# Patient Record
Sex: FEMALE | Race: OTHER | Employment: PART TIME | URBAN - METROPOLITAN AREA
[De-identification: names, ages, dates, MRNs, and addresses within clinical notes are randomized per-mention and may not be internally consistent; named-entity substitution may affect disease eponyms.]

---

## 2022-10-11 ENCOUNTER — APPOINTMENT (OUTPATIENT)
Dept: CT IMAGING | Age: 22
End: 2022-10-11
Attending: STUDENT IN AN ORGANIZED HEALTH CARE EDUCATION/TRAINING PROGRAM

## 2022-10-11 ENCOUNTER — APPOINTMENT (OUTPATIENT)
Dept: GENERAL RADIOLOGY | Age: 22
End: 2022-10-11
Attending: STUDENT IN AN ORGANIZED HEALTH CARE EDUCATION/TRAINING PROGRAM

## 2022-10-11 ENCOUNTER — HOSPITAL ENCOUNTER (EMERGENCY)
Age: 22
Discharge: HOME | End: 2022-10-11
Attending: EMERGENCY MEDICINE

## 2022-10-11 VITALS
RESPIRATION RATE: 16 BRPM | WEIGHT: 160 LBS | DIASTOLIC BLOOD PRESSURE: 84 MMHG | HEART RATE: 92 BPM | SYSTOLIC BLOOD PRESSURE: 123 MMHG | OXYGEN SATURATION: 98 % | TEMPERATURE: 98.5 F

## 2022-10-11 DIAGNOSIS — R07.9 CHEST PAIN, UNSPECIFIED TYPE: ICD-10-CM

## 2022-10-11 DIAGNOSIS — G44.89 OTHER HEADACHE SYNDROME: Primary | ICD-10-CM

## 2022-10-11 LAB
ALBUMIN SERPL-MCNC: 4.1 G/DL (ref 3.5–5)
ALBUMIN/GLOB SERPL: 0.9 {RATIO} (ref 1.1–2.2)
ALP SERPL-CCNC: 128 U/L (ref 45–117)
ALT SERPL-CCNC: 32 U/L (ref 12–78)
ANION GAP SERPL CALC-SCNC: 8 MMOL/L (ref 5–15)
AST SERPL-CCNC: 23 U/L (ref 15–37)
BASOPHILS # BLD: 0 K/UL (ref 0–0.1)
BASOPHILS NFR BLD: 0 % (ref 0–1)
BILIRUB SERPL-MCNC: 0.2 MG/DL (ref 0.2–1)
BUN SERPL-MCNC: 11 MG/DL (ref 6–20)
BUN/CREAT SERPL: 16 (ref 12–20)
CALCIUM SERPL-MCNC: 9.7 MG/DL (ref 8.5–10.1)
CHLORIDE SERPL-SCNC: 104 MMOL/L (ref 97–108)
CO2 SERPL-SCNC: 26 MMOL/L (ref 21–32)
COMMENT, HOLDF: NORMAL
CREAT SERPL-MCNC: 0.67 MG/DL (ref 0.55–1.02)
DIFFERENTIAL METHOD BLD: ABNORMAL
EOSINOPHIL # BLD: 0 K/UL (ref 0–0.4)
EOSINOPHIL NFR BLD: 0 % (ref 0–7)
ERYTHROCYTE [DISTWIDTH] IN BLOOD BY AUTOMATED COUNT: 13 % (ref 11.5–14.5)
GLOBULIN SER CALC-MCNC: 4.7 G/DL (ref 2–4)
GLUCOSE SERPL-MCNC: 93 MG/DL (ref 65–100)
HCG UR QL: NEGATIVE
HCT VFR BLD AUTO: 38.6 % (ref 35–47)
HGB BLD-MCNC: 12.8 G/DL (ref 11.5–16)
IMM GRANULOCYTES # BLD AUTO: 0 K/UL (ref 0–0.04)
IMM GRANULOCYTES NFR BLD AUTO: 0 % (ref 0–0.5)
LYMPHOCYTES # BLD: 3 K/UL (ref 0.8–3.5)
LYMPHOCYTES NFR BLD: 23 % (ref 12–49)
MCH RBC QN AUTO: 28.7 PG (ref 26–34)
MCHC RBC AUTO-ENTMCNC: 33.2 G/DL (ref 30–36.5)
MCV RBC AUTO: 86.5 FL (ref 80–99)
MONOCYTES # BLD: 0.8 K/UL (ref 0–1)
MONOCYTES NFR BLD: 6 % (ref 5–13)
NEUTS SEG # BLD: 9.1 K/UL (ref 1.8–8)
NEUTS SEG NFR BLD: 71 % (ref 32–75)
NRBC # BLD: 0 K/UL (ref 0–0.01)
NRBC BLD-RTO: 0 PER 100 WBC
PLATELET # BLD AUTO: 348 K/UL (ref 150–400)
PMV BLD AUTO: 10.6 FL (ref 8.9–12.9)
POTASSIUM SERPL-SCNC: 3.6 MMOL/L (ref 3.5–5.1)
PROT SERPL-MCNC: 8.8 G/DL (ref 6.4–8.2)
RBC # BLD AUTO: 4.46 M/UL (ref 3.8–5.2)
SAMPLES BEING HELD,HOLD: NORMAL
SODIUM SERPL-SCNC: 138 MMOL/L (ref 136–145)
TROPONIN-HIGH SENSITIVITY: <4 NG/L (ref 0–51)
WBC # BLD AUTO: 12.9 K/UL (ref 3.6–11)

## 2022-10-11 PROCEDURE — 70450 CT HEAD/BRAIN W/O DYE: CPT

## 2022-10-11 PROCEDURE — 80053 COMPREHEN METABOLIC PANEL: CPT

## 2022-10-11 PROCEDURE — 74011000250 HC RX REV CODE- 250: Performed by: STUDENT IN AN ORGANIZED HEALTH CARE EDUCATION/TRAINING PROGRAM

## 2022-10-11 PROCEDURE — 71250 CT THORAX DX C-: CPT

## 2022-10-11 PROCEDURE — 71046 X-RAY EXAM CHEST 2 VIEWS: CPT

## 2022-10-11 PROCEDURE — 96375 TX/PRO/DX INJ NEW DRUG ADDON: CPT

## 2022-10-11 PROCEDURE — 84484 ASSAY OF TROPONIN QUANT: CPT

## 2022-10-11 PROCEDURE — 36415 COLL VENOUS BLD VENIPUNCTURE: CPT

## 2022-10-11 PROCEDURE — 85025 COMPLETE CBC W/AUTO DIFF WBC: CPT

## 2022-10-11 PROCEDURE — 99285 EMERGENCY DEPT VISIT HI MDM: CPT

## 2022-10-11 PROCEDURE — 96374 THER/PROPH/DIAG INJ IV PUSH: CPT

## 2022-10-11 PROCEDURE — 74011250636 HC RX REV CODE- 250/636: Performed by: STUDENT IN AN ORGANIZED HEALTH CARE EDUCATION/TRAINING PROGRAM

## 2022-10-11 PROCEDURE — 74011250637 HC RX REV CODE- 250/637: Performed by: STUDENT IN AN ORGANIZED HEALTH CARE EDUCATION/TRAINING PROGRAM

## 2022-10-11 PROCEDURE — 81025 URINE PREGNANCY TEST: CPT

## 2022-10-11 PROCEDURE — 93005 ELECTROCARDIOGRAM TRACING: CPT

## 2022-10-11 RX ORDER — SODIUM CHLORIDE 9 MG/ML
1000 INJECTION, SOLUTION INTRAVENOUS ONCE
Status: COMPLETED | OUTPATIENT
Start: 2022-10-11 | End: 2022-10-11

## 2022-10-11 RX ORDER — SUMATRIPTAN 25 MG/1
TABLET, FILM COATED ORAL
Qty: 12 TABLET | Refills: 0 | Status: SHIPPED | OUTPATIENT
Start: 2022-10-11

## 2022-10-11 RX ORDER — KETOROLAC TROMETHAMINE 30 MG/ML
30 INJECTION, SOLUTION INTRAMUSCULAR; INTRAVENOUS
Status: COMPLETED | OUTPATIENT
Start: 2022-10-11 | End: 2022-10-11

## 2022-10-11 RX ORDER — PROCHLORPERAZINE EDISYLATE 5 MG/ML
10 INJECTION INTRAMUSCULAR; INTRAVENOUS
Status: COMPLETED | OUTPATIENT
Start: 2022-10-11 | End: 2022-10-11

## 2022-10-11 RX ORDER — DIPHENHYDRAMINE HYDROCHLORIDE 50 MG/ML
25 INJECTION, SOLUTION INTRAMUSCULAR; INTRAVENOUS
Status: COMPLETED | OUTPATIENT
Start: 2022-10-11 | End: 2022-10-11

## 2022-10-11 RX ORDER — DEXAMETHASONE SODIUM PHOSPHATE 10 MG/ML
10 INJECTION INTRAMUSCULAR; INTRAVENOUS ONCE
Status: COMPLETED | OUTPATIENT
Start: 2022-10-11 | End: 2022-10-11

## 2022-10-11 RX ADMIN — DEXAMETHASONE SODIUM PHOSPHATE 10 MG: 10 INJECTION, SOLUTION INTRAMUSCULAR; INTRAVENOUS at 18:14

## 2022-10-11 RX ADMIN — ALUMINUM HYDROXIDE, MAGNESIUM HYDROXIDE, DIMETHICONE 40 ML: 400; 400; 40 SUSPENSION ORAL at 18:54

## 2022-10-11 RX ADMIN — PROCHLORPERAZINE EDISYLATE 10 MG: 5 INJECTION INTRAMUSCULAR; INTRAVENOUS at 18:13

## 2022-10-11 RX ADMIN — SODIUM CHLORIDE 1000 ML/HR: 9 INJECTION, SOLUTION INTRAVENOUS at 18:13

## 2022-10-11 RX ADMIN — DIPHENHYDRAMINE HYDROCHLORIDE 25 MG: 50 INJECTION, SOLUTION INTRAMUSCULAR; INTRAVENOUS at 18:13

## 2022-10-11 RX ADMIN — KETOROLAC TROMETHAMINE 30 MG: 30 INJECTION, SOLUTION INTRAMUSCULAR at 18:14

## 2022-10-11 NOTE — DISCHARGE INSTRUCTIONS
Recommend follow-up with your primary care physician. Take sumatriptan if headache onsets again. If you develop new or worsening symptoms, please return to ER.

## 2022-10-11 NOTE — ED TRIAGE NOTES
Patient reports she started with a headache and dizziness on Thursday-    Patient reports she has been having chest pain for the last few years but reports it has gotten worse over the last few days-

## 2022-10-11 NOTE — ED PROVIDER NOTES
Patient is a 25year old female who presents to ED c/o headache which started 5 days prior. Reports headache is behind her right eye, constant, associated with photophobia, nausea, and dizziness. Patient reports headache has been constant and not alleviated by any factors. Patient also c/o left sided chest pain that has been intermittent \"for years\" states it has worsened the past few days and has been constant. She denies any fever, chills, nausea, vomiting, numbness, weakness, facial asymmetry, visual disturbances, neck pain, abdominal pain, urinary symptoms, cough, shortness of breath. Seen at [facility] and referred to ED for further evaluation and management. No past medical history on file. No past surgical history on file. No family history on file. Social History     Socioeconomic History    Marital status: Not on file     Spouse name: Not on file    Number of children: Not on file    Years of education: Not on file    Highest education level: Not on file   Occupational History    Not on file   Tobacco Use    Smoking status: Not on file    Smokeless tobacco: Not on file   Substance and Sexual Activity    Alcohol use: Not on file    Drug use: Not on file    Sexual activity: Not on file   Other Topics Concern    Not on file   Social History Narrative    Not on file     Social Determinants of Health     Financial Resource Strain: Not on file   Food Insecurity: Not on file   Transportation Needs: Not on file   Physical Activity: Not on file   Stress: Not on file   Social Connections: Not on file   Intimate Partner Violence: Not on file   Housing Stability: Not on file         ALLERGIES: Patient has no known allergies. Review of Systems   Constitutional:  Negative for activity change, appetite change, chills and fever. HENT:  Negative for congestion and sore throat. Eyes:  Positive for photophobia and pain. Negative for discharge, itching and visual disturbance.    Respiratory: Negative for cough and shortness of breath. Cardiovascular:  Positive for chest pain. Negative for palpitations and leg swelling. Gastrointestinal:  Negative for abdominal distention, abdominal pain, constipation, diarrhea, nausea and vomiting. Genitourinary:  Negative for decreased urine volume, dysuria, flank pain, frequency and urgency. Musculoskeletal:  Negative for back pain and neck pain. Skin:  Negative for rash and wound. Allergic/Immunologic: Negative for immunocompromised state. Neurological:  Positive for headaches. Negative for dizziness, tremors, seizures, syncope, facial asymmetry, speech difficulty, weakness, light-headedness and numbness. Psychiatric/Behavioral:  Negative for confusion. All other systems reviewed and are negative. Vitals:    10/11/22 1737   BP: 123/84   Pulse: 92   Resp: 16   Temp: 98.5 °F (36.9 °C)   SpO2: 98%   Weight: 72.6 kg (160 lb)            Physical Exam  Vitals and nursing note reviewed. Constitutional:       General: She is not in acute distress. Appearance: Normal appearance. She is well-developed. She is not toxic-appearing. HENT:      Head: Normocephalic and atraumatic. Nose: Nose normal.      Mouth/Throat:      Mouth: Mucous membranes are moist.   Eyes:      General: Lids are normal.      Extraocular Movements: Extraocular movements intact. Conjunctiva/sclera: Conjunctivae normal.      Pupils: Pupils are equal, round, and reactive to light. Cardiovascular:      Rate and Rhythm: Normal rate and regular rhythm. Pulses: Normal pulses. Heart sounds: Normal heart sounds, S1 normal and S2 normal.   Pulmonary:      Effort: Pulmonary effort is normal. No accessory muscle usage or respiratory distress. Breath sounds: Normal breath sounds. No stridor. No wheezing, rhonchi or rales. Chest:      Chest wall: No tenderness. Abdominal:      Palpations: Abdomen is soft. Tenderness: There is no abdominal tenderness. There is no right CVA tenderness, left CVA tenderness, guarding or rebound. Musculoskeletal:         General: Normal range of motion. Cervical back: Normal range of motion and neck supple. Skin:     General: Skin is warm and dry. Capillary Refill: Capillary refill takes less than 2 seconds. Neurological:      General: No focal deficit present. Mental Status: She is alert and oriented to person, place, and time. Mental status is at baseline. Cranial Nerves: Cranial nerves 2-12 are intact. No cranial nerve deficit. Sensory: No sensory deficit. Motor: No weakness. Coordination: Coordination normal. Finger-Nose-Finger Test normal.      Gait: Gait is intact. Gait normal.   Psychiatric:         Attention and Perception: Attention normal.         Mood and Affect: Mood and affect normal.         Speech: Speech normal.         Behavior: Behavior is cooperative. Thought Content: Thought content normal.         Cognition and Memory: Cognition normal.         Judgment: Judgment normal.        MDM  Number of Diagnoses or Management Options  Chest pain, unspecified type  Other headache syndrome  Diagnosis management comments: Patient presents with history as described above. Labs unremarkable. Ekg NSR, no ischemic changes noted. Troponin 4, low risk to r/o ACS. CT head negative. Patient given migraine cocktail and initially reported she was having a reaction. Evaluated patient  and her symptoms improved time. Chest x-ray showed possible foreign body in left mainstem bronchus. 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 Spoke with patient's mother who reports patient did have surgery and she was unsure the name of it. Patient reports complete resolution of symptoms and states she feels much improved. Discussed results and advise follow-up with neurology.   Strict return to ER precautions lorin in detail with patient and mother. All questions addressed and answered. Amount and/or Complexity of Data Reviewed  Clinical lab tests: reviewed  Tests in the radiology section of CPT®: reviewed  Discuss the patient with other providers: yes (Dr. Sandi Lopez, ED attending )      ED Course as of 10/11/22 2318   Tue Oct 11, 2022   1830 Called to patient's bedside by nurse because patient reported near syncope and shaking after being administered diphenhydramine. Neurologic exam was unremarkable at that time. Shaking noted at rest when present,  but when patient is alone there is no visible tremor or shaking. She is resting comfortably in NAD. Texting on her cell phone without any shaking noted. [KG]   1943 CT CHEST WO CONT: IMPRESSION  1. High density foreign body located between aortic arch and main pulmonary  trunk most suggestive of remote PDA closure device. Correlate with history and  prior imaging. 2.  No acute airspace disease or foreign body within the airway.  [KG]      ED Course User Index  [KG] Alan Jaffe [facility]

## 2022-10-11 NOTE — Clinical Note
[address]  [facility] EMERGENCY DEPT  [address] [id number]  [phone]    Work/School Note    Date: 10/11/2022    To Whom It May concern:    Gisselle White was seen and treated today in the emergency room by the following provider(s):  Attending Provider: Eric Ferreira MD  Physician Assistant: SONG Strong. Gisselle White is excused from work/school on 10/11/22 and 10/12/22. She is medically clear to return to work/school on 10/13/2022.        Sincerely,          SONG Petersen

## 2022-10-12 LAB
ATRIAL RATE: 92 BPM
CALCULATED P AXIS, ECG09: 35 DEGREES
CALCULATED R AXIS, ECG10: 63 DEGREES
CALCULATED T AXIS, ECG11: 49 DEGREES
DIAGNOSIS, 93000: NORMAL
P-R INTERVAL, ECG05: 148 MS
Q-T INTERVAL, ECG07: 358 MS
QRS DURATION, ECG06: 78 MS
QTC CALCULATION (BEZET), ECG08: 442 MS
VENTRICULAR RATE, ECG03: 92 BPM

## 2023-04-19 ENCOUNTER — TRANSCRIBE ORDER (OUTPATIENT)
Dept: SCHEDULING | Age: 23
End: 2023-04-19

## 2023-04-19 DIAGNOSIS — R10.2 PELVIC PAIN IN FEMALE: Primary | ICD-10-CM

## 2023-04-23 DIAGNOSIS — R10.2 PELVIC PAIN IN FEMALE: Primary | ICD-10-CM

## 2023-04-24 DIAGNOSIS — R10.2 PELVIC PAIN IN FEMALE: Primary | ICD-10-CM

## 2023-05-09 ENCOUNTER — HOSPITAL ENCOUNTER (OUTPATIENT)
Facility: HOSPITAL | Age: 23
Discharge: HOME | End: 2023-05-12

## 2023-05-09 DIAGNOSIS — R10.2 PELVIC PAIN IN FEMALE: ICD-10-CM

## 2023-05-09 PROCEDURE — 76830 TRANSVAGINAL US NON-OB: CPT

## 2023-05-09 PROCEDURE — 76856 US EXAM PELVIC COMPLETE: CPT

## 2024-04-25 ENCOUNTER — INITIAL PRENATAL (OUTPATIENT)
Age: 24
End: 2024-04-25

## 2024-04-25 ENCOUNTER — OFFICE VISIT (OUTPATIENT)
Age: 24
End: 2024-04-25

## 2024-04-25 VITALS
HEIGHT: 63 IN | DIASTOLIC BLOOD PRESSURE: 63 MMHG | OXYGEN SATURATION: 98 % | HEART RATE: 83 BPM | BODY MASS INDEX: 27.11 KG/M2 | SYSTOLIC BLOOD PRESSURE: 98 MMHG | TEMPERATURE: 98.6 F | RESPIRATION RATE: 18 BRPM | WEIGHT: 153 LBS

## 2024-04-25 DIAGNOSIS — Z98.890 HISTORY OF HEART SURGERY: ICD-10-CM

## 2024-04-25 DIAGNOSIS — Z13.31 POSITIVE DEPRESSION SCREENING: ICD-10-CM

## 2024-04-25 DIAGNOSIS — O26.899 VAGINAL DISCHARGE DURING PREGNANCY, ANTEPARTUM: ICD-10-CM

## 2024-04-25 DIAGNOSIS — Z59.9 FINANCIAL DIFFICULTY: Primary | ICD-10-CM

## 2024-04-25 DIAGNOSIS — O21.9 NAUSEA AND VOMITING IN PREGNANCY: ICD-10-CM

## 2024-04-25 DIAGNOSIS — Z34.80 SUPERVISION OF OTHER NORMAL PREGNANCY, ANTEPARTUM: Primary | ICD-10-CM

## 2024-04-25 DIAGNOSIS — O23.599 BACTERIAL VAGINOSIS IN PREGNANCY: ICD-10-CM

## 2024-04-25 DIAGNOSIS — O99.340 DEPRESSIVE DISORDER IN MOTHER AFFECTING PREGNANCY: ICD-10-CM

## 2024-04-25 DIAGNOSIS — F32.A DEPRESSIVE DISORDER IN MOTHER AFFECTING PREGNANCY: ICD-10-CM

## 2024-04-25 DIAGNOSIS — N89.8 VAGINAL DISCHARGE DURING PREGNANCY, ANTEPARTUM: ICD-10-CM

## 2024-04-25 DIAGNOSIS — Z13.9 ENCOUNTER FOR SCREENING INVOLVING SOCIAL DETERMINANTS OF HEALTH (SDOH): ICD-10-CM

## 2024-04-25 DIAGNOSIS — B96.89 BACTERIAL VAGINOSIS IN PREGNANCY: ICD-10-CM

## 2024-04-25 LAB
BACTERIA, WET MOUNT, POC: NORMAL
BILIRUBIN, URINE, POC: NEGATIVE
BLOOD URINE, POC: NEGATIVE
CLUE CELLS, WET MOUNT, POC: PRESENT
GLUCOSE URINE, POC: NEGATIVE
KETONES, URINE, POC: NEGATIVE
LEUKOCYTE ESTERASE, URINE, POC: NEGATIVE
NITRITE, URINE, POC: NEGATIVE
PH, URINE, POC: 7 (ref 4.6–8)
PROTEIN,URINE, POC: NEGATIVE
RBC WET MOUNT, POC: NEGATIVE
SPECIFIC GRAVITY, URINE, POC: 1.02 (ref 1–1.03)
TRICH, WET MOUNT, POC: NORMAL
URINALYSIS CLARITY, POC: CLEAR
URINALYSIS COLOR, POC: YELLOW
UROBILINOGEN, POC: NORMAL
WBC, WET MOUNT, POC: NORMAL
YEAST, WET MOUNT, POC: NORMAL

## 2024-04-25 PROCEDURE — 87210 SMEAR WET MOUNT SALINE/INK: CPT | Performed by: FAMILY MEDICINE

## 2024-04-25 PROCEDURE — 81003 URINALYSIS AUTO W/O SCOPE: CPT | Performed by: FAMILY MEDICINE

## 2024-04-25 RX ORDER — METRONIDAZOLE 500 MG/1
500 TABLET ORAL 2 TIMES DAILY
Qty: 14 TABLET | Refills: 0 | Status: SHIPPED | OUTPATIENT
Start: 2024-04-25 | End: 2024-05-02

## 2024-04-25 RX ORDER — PROMETHAZINE HYDROCHLORIDE 12.5 MG/1
12.5 TABLET ORAL 3 TIMES DAILY PRN
Qty: 30 TABLET | Refills: 0 | Status: SHIPPED | OUTPATIENT
Start: 2024-04-25

## 2024-04-25 RX ORDER — PYRIDOXINE HCL (VITAMIN B6) 50 MG
TABLET ORAL
Qty: 30 TABLET | Refills: 3 | Status: SHIPPED | OUTPATIENT
Start: 2024-04-25

## 2024-04-25 SDOH — ECONOMIC STABILITY: HOUSING INSECURITY
IN THE LAST 12 MONTHS, WAS THERE A TIME WHEN YOU DID NOT HAVE A STEADY PLACE TO SLEEP OR SLEPT IN A SHELTER (INCLUDING NOW)?: NO

## 2024-04-25 SDOH — ECONOMIC STABILITY: INCOME INSECURITY: HOW HARD IS IT FOR YOU TO PAY FOR THE VERY BASICS LIKE FOOD, HOUSING, MEDICAL CARE, AND HEATING?: SOMEWHAT HARD

## 2024-04-25 SDOH — ECONOMIC STABILITY: FOOD INSECURITY: WITHIN THE PAST 12 MONTHS, THE FOOD YOU BOUGHT JUST DIDN'T LAST AND YOU DIDN'T HAVE MONEY TO GET MORE.: PATIENT DECLINED

## 2024-04-25 SDOH — ECONOMIC STABILITY: FOOD INSECURITY: WITHIN THE PAST 12 MONTHS, YOU WORRIED THAT YOUR FOOD WOULD RUN OUT BEFORE YOU GOT MONEY TO BUY MORE.: PATIENT DECLINED

## 2024-04-25 SDOH — ECONOMIC STABILITY - INCOME SECURITY: PROBLEM RELATED TO HOUSING AND ECONOMIC CIRCUMSTANCES, UNSPECIFIED: Z59.9

## 2024-04-25 ASSESSMENT — PATIENT HEALTH QUESTIONNAIRE - PHQ9
10. IF YOU CHECKED OFF ANY PROBLEMS, HOW DIFFICULT HAVE THESE PROBLEMS MADE IT FOR YOU TO DO YOUR WORK, TAKE CARE OF THINGS AT HOME, OR GET ALONG WITH OTHER PEOPLE: VERY DIFFICULT
SUM OF ALL RESPONSES TO PHQ QUESTIONS 1-9: 13
3. TROUBLE FALLING OR STAYING ASLEEP: SEVERAL DAYS
5. POOR APPETITE OR OVEREATING: MORE THAN HALF THE DAYS
7. TROUBLE CONCENTRATING ON THINGS, SUCH AS READING THE NEWSPAPER OR WATCHING TELEVISION: SEVERAL DAYS
6. FEELING BAD ABOUT YOURSELF - OR THAT YOU ARE A FAILURE OR HAVE LET YOURSELF OR YOUR FAMILY DOWN: SEVERAL DAYS
SUM OF ALL RESPONSES TO PHQ9 QUESTIONS 1 & 2: 5
SUM OF ALL RESPONSES TO PHQ QUESTIONS 1-9: 13
2. FEELING DOWN, DEPRESSED OR HOPELESS: NEARLY EVERY DAY
SUM OF ALL RESPONSES TO PHQ QUESTIONS 1-9: 13
8. MOVING OR SPEAKING SO SLOWLY THAT OTHER PEOPLE COULD HAVE NOTICED. OR THE OPPOSITE, BEING SO FIGETY OR RESTLESS THAT YOU HAVE BEEN MOVING AROUND A LOT MORE THAN USUAL: SEVERAL DAYS
9. THOUGHTS THAT YOU WOULD BE BETTER OFF DEAD, OR OF HURTING YOURSELF: NOT AT ALL
SUM OF ALL RESPONSES TO PHQ QUESTIONS 1-9: 13
1. LITTLE INTEREST OR PLEASURE IN DOING THINGS: MORE THAN HALF THE DAYS
4. FEELING TIRED OR HAVING LITTLE ENERGY: MORE THAN HALF THE DAYS

## 2024-04-25 ASSESSMENT — ANXIETY QUESTIONNAIRES
IF YOU CHECKED OFF ANY PROBLEMS ON THIS QUESTIONNAIRE, HOW DIFFICULT HAVE THESE PROBLEMS MADE IT FOR YOU TO DO YOUR WORK, TAKE CARE OF THINGS AT HOME, OR GET ALONG WITH OTHER PEOPLE: VERY DIFFICULT
3. WORRYING TOO MUCH ABOUT DIFFERENT THINGS: NEARLY EVERY DAY
7. FEELING AFRAID AS IF SOMETHING AWFUL MIGHT HAPPEN: NEARLY EVERY DAY
6. BECOMING EASILY ANNOYED OR IRRITABLE: NEARLY EVERY DAY
5. BEING SO RESTLESS THAT IT IS HARD TO SIT STILL: SEVERAL DAYS
2. NOT BEING ABLE TO STOP OR CONTROL WORRYING: MORE THAN HALF THE DAYS
1. FEELING NERVOUS, ANXIOUS, OR ON EDGE: MORE THAN HALF THE DAYS
GAD7 TOTAL SCORE: 16
4. TROUBLE RELAXING: MORE THAN HALF THE DAYS

## 2024-04-25 NOTE — PATIENT INSTRUCTIONS
[id number]  1st and 3rd Fridays, distribution starts at 4:30 pm  [address] [facility] [address]      [facility] (continued)   Saturdays, distribution starts at 10 am  1st - [employer], [address] [facility] [address]  2nd - [facility] [employer], [address] [facility] [address]  3rd - [facility], [address] [address] [place] [address]  4th - [facility] [employer], [address] [facility] [address]    [facility] - [address] [place] [address]  [phone]; Thursdays 530 pm -730 pm & Fridays 1130 am - 130 pm    [facility] - ERP - [address] [place], [place]  [id number] [phone]    [address] [address] [phone]; Monday and Tuesday, 2 pm, 3rd Wednesday 12 pm, 4th Friday 9 am    [facility] [place] - [address] [place] [address] [phone]; Saturday 8-10:30 am     [address] [address] [phone]; Tuesday 9 am - 12 pm & 3-5 pm, Friday 3-5 pm, Saturday 9 am - 12 pm

## 2024-04-25 NOTE — PATIENT INSTRUCTIONS
financiero: [phone]    [facility]  Lo que ofrecen: recursos de atención de la beau móviles para pacientes no asegurados.  Contacto: [phone]    [facility]  Lo que ofrecen: pruebas de detección y vacunas.  Contacto: [phone]      [employer] ([facility])  Lo que ofrecen: mamografías y [place] independientemente de pathak capacidad de pago.  Contacto: [phone]    Asistencia financiera para la beau de [facility]  Lo que ofrecen: U proporciona asistencia financiera a los pacientes en función de maurice ingresos, activos y necesidades. También se puede brindar asistencia para obtener seguro de beau gratuito o de bajo costo o para organizar un plan de pago manejable.   Sitio web: [email or web address]-[email or web address]-assistance  La solicitud de asistencia se puede descargar del sitio web anterior.  [facility] telefóSt. [facility] Hospitalo de asesoramiento financiero: [phone]      Medicamentos    Good Rx  Lo que ofrecen: Good Rx hace un seguimiento de precios de fármacos recetados y ofrece cupones gratuitos de fármacos para obtener descuentos en medicamentos.  XIFINio web: https://www.AmVac/     [facility]   Lo que ofrecen: [facility] ofrece información gratuita sobre medicamentos y programas de ahorros en costos de atención de la beau, incluidos programas de asistencia con recetas, cupones y programas de descuento.  XIFINio web: https://www.ZYOMYX.org/   Línea de ayuda: [phone]    RX Assist   Lo que ofrecen: información sobre programas de medicamentos gratuitos y de bajo costo.  Sitio web: https://www.rxassist.org/     Programa de recetas de $4 de [employer]  Lo que ofrecen: el Programa de recetas incluye un suministro de hasta 30 días por $4 y un suministro de hasta 90 días por $10 en algunos fármacos genéricos cubiertos en las dosis frecuentemente recetadas.  Sitio web: https://www.[employer]/cp/4-prescriptions/1256595.            Servicios públicos    CommonHelp  Lo que

## 2024-04-25 NOTE — PROGRESS NOTES
SW Navigator met with patient to complete initial social needs assessment. Patient verified and confirmed demographics on file.      Review of SDOH:   +financial difficulty, +depression    Medical insurance? 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 Not enrolled  - Support system, FOB and family  - Support Services - Are you aware of social programs available to you? Yes, recently applied for SNAP benefits  - OB: Do you have the support needed once baby arrives? yes  - Any other worries or concerns, no    Personal Safety:  Domestic violence - No hx of domestic violence  General safety - Patient feels safe in relationship, in home, and in neighborhood    Depression screening:   PHQ2=4, positive  PHQ9=13, positive    Patient is 24 yo pregnant female. She lives in home with her partner and her 2 children ages 9 and 7 (both children in school and doing well). Patient's mother and step-father also live in home. 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 Finances have been a bit strained lately as she has not been to work due to not feeling well. Patient reports feeling anxious, not herself, and crying daily. Patient with prior hx of depression and anxiety during pregnancy. Positive depression screening today. AASHISH provided patient with

## 2024-04-25 NOTE — PROGRESS NOTES
[facility] Office Visit     Assessment/ Plan: Susana Navarro is a 23 y.o.  here for IOB.     24yo  at 11w4 by L/    IUP: IOB labs today  [facility]  genetic testing with insurance   Depression/Anxiety: met with SW, start zoloft 50mg daily  Nausea/Vomiting/Weight Loss: trial of B6 + phenergan, CMP/TSH  Vaginal Discharge: consistent with BV - metronidazole     Met with CM/AASHISH, applying for Medicaid   Estimated Date of Delivery: 11/10/24    30 minutes were spent on the day of this encounter both with the patient and in related activities including chart review, care coordination and counseling.     Patient instructions were discussed and/or provided in the AVS. The patient understands and agrees to the plan.    RETURN TO CARE: 1 month   Future Appointments   Date Time Provider Department Center   2024  8:40 AM Marybeth Dawkins, DO [facility] BS AMB   2024  8:40 AM Marybeth Dawkins, DO [facility] BS AMB         Subjective:  Chief Complaint   Patient presents with    Initial Prenatal Visit     Patient is coming in for her initial prenatal visit. LMP was 2024. She is G3, P2. Patient is 11 weeks and 4 day. JOSE ELIAS: 11/10 2024. She is not having vaginal bleeding. She is having white pasty discharge with odor and itchiness. She is taking prenatal vitamins. She is having fetal movement. No contractions. Patient has nausea, vomiting, and dizziness since the beginning of her pregnancy. Patient is also having right ear pain since yesterday. No other concerns.        HPI: Susana Navarro is a 23 y.o.  at 11w4 who is being seen today for her first obstetrical visit.    Vaginal Discharge  - went [facility] ER and was told no infection (went to [facility])   - sometimes itchy, smells bad - white paste   - has tried wipes (Vagisil, baby wipes)     Pap   - NILM 3/12, neg G/C/T    Prenatal Labs  - T pall neg ([facility]), HIV neg, CBC

## 2024-04-25 NOTE — PROGRESS NOTES
Susana Navarro is a 23 y.o. female      Chief Complaint   Patient presents with    Initial Prenatal Visit     Patient is coming in for her initial prenatal visit. LMP was 02/04/2024. She is G3, P2. Patient is 11 weeks and 4 day. JOSE ELIAS: 11/10 2024. She is not having vaginal bleeding. She is having white pasty discharge with odor and itchiness. She is taking prenatal vitamins. She is having fetal movement. No contractions. Patient has nausea, vomiting, and dizziness since the beginning of her pregnancy. Patient is also having right ear pain since yesterday. No other concerns.        \"Have you been to the ER, urgent care clinic since your last visit?  Hospitalized since your last visit?\"    NO    “Have you seen or consulted any other health care providers outside of [facility] since your last visit?”    Yes, Patient coming in from [facility].          Vitals:    04/25/24 0921   BP: 98/63   Site: Right Upper Arm   Position: Sitting   Pulse: 83   Resp: 18   Temp: 98.6 °F (37 °C)   TempSrc: Oral   SpO2: 98%   Weight: 69.4 kg (153 lb)   Height: 1.6 m (5' 3\")            Health Maintenance Due   Topic Date Due    Hepatitis B vaccine (1 of 3 - 3-dose series) Never done    COVID-19 Vaccine (1) Never done    Varicella vaccine (1 of 2 - 2-dose childhood series) Never done    HPV vaccine (1 - 2-dose series) Never done    Depression Screen  Never done    HIV screen  Never done    Chlamydia/GC screen  Never done    Hepatitis C screen  Never done    DTaP/Tdap/Td vaccine (1 - Tdap) Never done    Pap smear  Never done         Medication Reconciliation completed, changes noted.  Please  Update medication list.

## 2024-04-26 PROBLEM — O99.340 DEPRESSIVE DISORDER IN MOTHER AFFECTING PREGNANCY: Status: ACTIVE | Noted: 2024-04-26

## 2024-04-26 PROBLEM — O21.9 NAUSEA AND VOMITING IN PREGNANCY: Status: ACTIVE | Noted: 2024-04-26

## 2024-04-26 PROBLEM — Z98.890 HISTORY OF HEART SURGERY: Status: ACTIVE | Noted: 2024-04-26

## 2024-04-26 PROBLEM — F32.A DEPRESSIVE DISORDER IN MOTHER AFFECTING PREGNANCY: Status: ACTIVE | Noted: 2024-04-26

## 2024-04-26 LAB
ABO + RH BLD: NORMAL
ALBUMIN SERPL-MCNC: 3.7 G/DL (ref 3.5–5)
ALBUMIN/GLOB SERPL: 0.9 (ref 1.1–2.2)
ALP SERPL-CCNC: 124 U/L (ref 45–117)
ALT SERPL-CCNC: 41 U/L (ref 12–78)
ANION GAP SERPL CALC-SCNC: 9 MMOL/L (ref 5–15)
AST SERPL-CCNC: 12 U/L (ref 15–37)
BACTERIA SPEC CULT: ABNORMAL
BILIRUB SERPL-MCNC: 0.3 MG/DL (ref 0.2–1)
BLOOD BANK CMNT PATIENT-IMP: NORMAL
BLOOD GROUP ANTIBODIES SERPL: NORMAL
BUN SERPL-MCNC: 4 MG/DL (ref 6–20)
BUN/CREAT SERPL: 8 (ref 12–20)
CALCIUM SERPL-MCNC: 9.8 MG/DL (ref 8.5–10.1)
CC UR VC: ABNORMAL
CHLORIDE SERPL-SCNC: 104 MMOL/L (ref 97–108)
CO2 SERPL-SCNC: 24 MMOL/L (ref 21–32)
CREAT SERPL-MCNC: 0.48 MG/DL (ref 0.55–1.02)
ERYTHROCYTE [DISTWIDTH] IN BLOOD BY AUTOMATED COUNT: 13.2 % (ref 11.5–14.5)
EST. AVERAGE GLUCOSE BLD GHB EST-MCNC: 100 MG/DL
GLOBULIN SER CALC-MCNC: 4.1 G/DL (ref 2–4)
GLUCOSE SERPL-MCNC: 78 MG/DL (ref 65–100)
HBA1C MFR BLD: 5.1 % (ref 4–5.6)
HBV SURFACE AB SER QL: REACTIVE
HBV SURFACE AB SER-ACNC: 714.46 MIU/ML
HBV SURFACE AG SER QL: <0.1 INDEX
HBV SURFACE AG SER QL: NEGATIVE
HCT VFR BLD AUTO: 36.3 % (ref 35–47)
HCV AB SER IA-ACNC: 0.19 INDEX
HCV AB SERPL QL IA: NONREACTIVE
HGB BLD-MCNC: 12.4 G/DL (ref 11.5–16)
HIV 1+2 AB+HIV1 P24 AG SERPL QL IA: NONREACTIVE
HIV 1/2 RESULT COMMENT: NORMAL
MCH RBC QN AUTO: 29.3 PG (ref 26–34)
MCHC RBC AUTO-ENTMCNC: 34.2 G/DL (ref 30–36.5)
MCV RBC AUTO: 85.8 FL (ref 80–99)
NRBC # BLD: 0 K/UL (ref 0–0.01)
NRBC BLD-RTO: 0 PER 100 WBC
PLATELET # BLD AUTO: 284 K/UL (ref 150–400)
PMV BLD AUTO: 11.6 FL (ref 8.9–12.9)
POTASSIUM SERPL-SCNC: 3.9 MMOL/L (ref 3.5–5.1)
PROT SERPL-MCNC: 7.8 G/DL (ref 6.4–8.2)
RBC # BLD AUTO: 4.23 M/UL (ref 3.8–5.2)
RPR SER QL: NONREACTIVE
RUBV IGG SERPL IA-ACNC: NORMAL IU/ML
SERVICE CMNT-IMP: ABNORMAL
SODIUM SERPL-SCNC: 137 MMOL/L (ref 136–145)
SPECIMEN EXP DATE BLD: NORMAL
TSH SERPL DL<=0.05 MIU/L-ACNC: 0.49 UIU/ML (ref 0.36–3.74)
WBC # BLD AUTO: 9.2 K/UL (ref 3.6–11)

## 2024-04-27 LAB
HBV CORE AB SERPL QL IA: NEGATIVE
VZV IGG SER IA-ACNC: 567 INDEX

## 2024-04-29 LAB
C TRACH RRNA SPEC QL NAA+PROBE: NEGATIVE
N GONORRHOEA RRNA SPEC QL NAA+PROBE: NEGATIVE
SPECIMEN SOURCE: NORMAL
T VAGINALIS RRNA SPEC QL NAA+PROBE: NEGATIVE

## 2024-05-01 LAB
HGB A MFR BLD: 96.9 % (ref 96.4–98.8)
HGB A2 MFR BLD COLUMN CHROM: 3.1 % (ref 1.8–3.2)
HGB F MFR BLD: 0 % (ref 0–2)
HGB FRACT BLD-IMP: NORMAL
HGB S MFR BLD: 0 %

## 2024-05-23 ENCOUNTER — ROUTINE PRENATAL (OUTPATIENT)
Age: 24
End: 2024-05-23

## 2024-05-23 ENCOUNTER — OFFICE VISIT (OUTPATIENT)
Age: 24
End: 2024-05-23

## 2024-05-23 VITALS
SYSTOLIC BLOOD PRESSURE: 99 MMHG | RESPIRATION RATE: 18 BRPM | BODY MASS INDEX: 26.93 KG/M2 | TEMPERATURE: 98.6 F | HEART RATE: 97 BPM | DIASTOLIC BLOOD PRESSURE: 64 MMHG | WEIGHT: 152 LBS | OXYGEN SATURATION: 98 % | HEIGHT: 63 IN

## 2024-05-23 DIAGNOSIS — Z78.9 NEED FOR FOLLOW-UP BY SOCIAL WORKER: Primary | ICD-10-CM

## 2024-05-23 DIAGNOSIS — F32.A DEPRESSIVE DISORDER IN MOTHER AFFECTING PREGNANCY: ICD-10-CM

## 2024-05-23 DIAGNOSIS — R30.0 DYSURIA: ICD-10-CM

## 2024-05-23 DIAGNOSIS — R82.71 ASYMPTOMATIC BACTERIURIA DURING PREGNANCY: ICD-10-CM

## 2024-05-23 DIAGNOSIS — O99.891 ASYMPTOMATIC BACTERIURIA DURING PREGNANCY: ICD-10-CM

## 2024-05-23 DIAGNOSIS — O99.340 DEPRESSIVE DISORDER IN MOTHER AFFECTING PREGNANCY: ICD-10-CM

## 2024-05-23 DIAGNOSIS — O21.9 NAUSEA AND VOMITING IN PREGNANCY: ICD-10-CM

## 2024-05-23 DIAGNOSIS — G47.00 INSOMNIA, UNSPECIFIED TYPE: ICD-10-CM

## 2024-05-23 DIAGNOSIS — Z34.80 SUPERVISION OF OTHER NORMAL PREGNANCY, ANTEPARTUM: Primary | ICD-10-CM

## 2024-05-23 LAB
BILIRUBIN, URINE, POC: NEGATIVE
BLOOD URINE, POC: NEGATIVE
GLUCOSE URINE, POC: NEGATIVE
KETONES, URINE, POC: NEGATIVE
LEUKOCYTE ESTERASE, URINE, POC: NEGATIVE
NITRITE, URINE, POC: NEGATIVE
PH, URINE, POC: 7 (ref 4.6–8)
PROTEIN,URINE, POC: NEGATIVE
SPECIFIC GRAVITY, URINE, POC: 1.01 (ref 1–1.03)
URINALYSIS CLARITY, POC: CLEAR
URINALYSIS COLOR, POC: YELLOW
UROBILINOGEN, POC: NORMAL

## 2024-05-23 PROCEDURE — 81003 URINALYSIS AUTO W/O SCOPE: CPT | Performed by: FAMILY MEDICINE

## 2024-05-23 PROCEDURE — 0502F SUBSEQUENT PRENATAL CARE: CPT | Performed by: FAMILY MEDICINE

## 2024-05-23 PROCEDURE — PBSHW AMB POC URINALYSIS DIP STICK AUTO W/O MICRO: Performed by: FAMILY MEDICINE

## 2024-05-23 RX ORDER — NITROFURANTOIN 25; 75 MG/1; MG/1
100 CAPSULE ORAL 2 TIMES DAILY
Qty: 10 CAPSULE | Refills: 0 | Status: SHIPPED | OUTPATIENT
Start: 2024-05-23 | End: 2024-05-28

## 2024-05-23 RX ORDER — HYDROXYZINE HYDROCHLORIDE 25 MG/1
25 TABLET, FILM COATED ORAL NIGHTLY PRN
Qty: 30 TABLET | Refills: 1 | Status: SHIPPED | OUTPATIENT
Start: 2024-05-23

## 2024-05-23 RX ORDER — ONDANSETRON 4 MG/1
4 TABLET, ORALLY DISINTEGRATING ORAL 3 TIMES DAILY PRN
Qty: 30 TABLET | Refills: 1 | Status: SHIPPED | OUTPATIENT
Start: 2024-05-23

## 2024-05-23 ASSESSMENT — PATIENT HEALTH QUESTIONNAIRE - PHQ9
8. MOVING OR SPEAKING SO SLOWLY THAT OTHER PEOPLE COULD HAVE NOTICED. OR THE OPPOSITE, BEING SO FIGETY OR RESTLESS THAT YOU HAVE BEEN MOVING AROUND A LOT MORE THAN USUAL: NOT AT ALL
SUM OF ALL RESPONSES TO PHQ QUESTIONS 1-9: 13
SUM OF ALL RESPONSES TO PHQ QUESTIONS 1-9: 13
5. POOR APPETITE OR OVEREATING: SEVERAL DAYS
3. TROUBLE FALLING OR STAYING ASLEEP: NEARLY EVERY DAY
SUM OF ALL RESPONSES TO PHQ QUESTIONS 1-9: 13
SUM OF ALL RESPONSES TO PHQ9 QUESTIONS 1 & 2: 3
2. FEELING DOWN, DEPRESSED OR HOPELESS: MORE THAN HALF THE DAYS
7. TROUBLE CONCENTRATING ON THINGS, SUCH AS READING THE NEWSPAPER OR WATCHING TELEVISION: SEVERAL DAYS
9. THOUGHTS THAT YOU WOULD BE BETTER OFF DEAD, OR OF HURTING YOURSELF: NOT AT ALL
1. LITTLE INTEREST OR PLEASURE IN DOING THINGS: SEVERAL DAYS
6. FEELING BAD ABOUT YOURSELF - OR THAT YOU ARE A FAILURE OR HAVE LET YOURSELF OR YOUR FAMILY DOWN: NEARLY EVERY DAY
SUM OF ALL RESPONSES TO PHQ QUESTIONS 1-9: 13
4. FEELING TIRED OR HAVING LITTLE ENERGY: MORE THAN HALF THE DAYS

## 2024-05-23 NOTE — PROGRESS NOTES
Susana Navarro is a 23 y.o. female      Chief Complaint   Patient presents with    Routine Prenatal Visit     Patient is 15 weeks and 4 day. She is not having vaginal bleeding or discharge. She is taking her prenatal vitamins. No fetal movement yet. No contractions. She has been having a burning a sensation when urinating and has frequency. No other concerns.        \"Have you been to the ER, urgent care clinic since your last visit?  Hospitalized since your last visit?\"    NO    “Have you seen or consulted any other health care providers outside of [facility] since your last visit?”    NO              Vitals:    05/23/24 0847   BP: 99/64   Site: Right Upper Arm   Position: Sitting   Pulse: 97   Resp: 18   Temp: 98.6 °F (37 °C)   TempSrc: Oral   SpO2: 98%   Weight: 68.9 kg (152 lb)   Height: 1.6 m (5' 3\")            Health Maintenance Due   Topic Date Due    Hepatitis B vaccine (1 of 3 - 3-dose series) Never done    COVID-19 Vaccine (1) Never done    Varicella vaccine (1 of 2 - 2-dose childhood series) Never done    HPV vaccine (1 - 2-dose series) Never done    DTaP/Tdap/Td vaccine (1 - Tdap) Never done    Pap smear  Never done         Medication Reconciliation completed, changes noted.  Please  Update medication list.

## 2024-05-23 NOTE — PROGRESS NOTES
Chief Complaint   Patient presents with    Routine Prenatal Visit     Patient is 15 weeks and 4 day. She is not having vaginal bleeding or discharge. She is taking her prenatal vitamins. No fetal movement yet. No contractions. She has been having a burning a sensation when urinating and has frequency. No other concerns.      Feels like has to hold belly when walks because of lower pelvic pain  nausea getting better   Stopped zoloft because took once and threw up  trouble sleeping, crying more, nothing from 2-4am  Not eating much    24yo  at 15w4d by L/11    IUP: Rh pos  hep B immune  pap at [facility]  genetic testing with insurance  anatomy scheduled    Depression/Anxiety, Insomnia: met with SW  stopped Zoloft (only took once, nausea)  offered alternatives but declined at this time  trial of hydroxyzine for sleep   Nausea/Vomiting/Weight Loss: trial of B6 + phenergan, CMP/TSH ok  improving, will trial Zofran  Vaginal Discharge: consistent with BV - metronidazole  resolved, stopped meds because of nausea   Asx Bacteriuria  Dysuria Now: 5000 GNRs, not treated  repeat urine culture today - since sx will start Macrobid, await culture     Met with CM/SW, applying for Medicaid - met again today   Estimated Date of Delivery: 11/10/24

## 2024-05-23 NOTE — PROGRESS NOTES
Medicaid follow-up visit with  Navigator.    Patient previously applied on her own for Medicaid. Initial Medicaid application completed on 2/29/24 (at the time she was not pregnant). Patient approved for \"limited coverage\" only (case #[id number]). Patient then reported a change (pregnancy) on 3/26/24 via application [id number]. [facility] advising denial due to duplicate application.     Patient concerned and does not know what else to do. SW assisted patient by contacting [facility] , ITALIA Pineda, at [phone]. No answer and detailed voicemail was left requesting return call. SW sent written request via fax to [facility]. SW advised patient to allow some time for  to respond. Should patient or SW not hear back from [facility] regarding matter, request can be escalated to the state for additional assistance.    SW to follow-up with patient within 2 weeks.    [facility] Rash[facility]   Navigator

## 2024-05-24 LAB
BACTERIA SPEC CULT: NORMAL
SERVICE CMNT-IMP: NORMAL

## 2024-06-04 ENCOUNTER — TELEPHONE (OUTPATIENT)
Age: 24
End: 2024-06-04

## 2024-06-04 NOTE — TELEPHONE ENCOUNTER
Pt is 17 weeks pregnant with c/o \"feeling like she is going to pass out\" and dizziness. She reported that she has been feeling like this for 20 minutes, while sitting or standing. When asked if she is experiencing any other Sxs, she stated that she has been experiencing pain in eyes since yesterday.     This writer spoke with Dr. Chan, whom advised that pt should be seen at an ED as soon as possible, due to no avail appts at this location.    Pt was informed and replied that she will attempt to secure transportation to ED.

## 2024-06-20 ENCOUNTER — ROUTINE PRENATAL (OUTPATIENT)
Age: 24
End: 2024-06-20
Payer: MEDICAID

## 2024-06-20 ENCOUNTER — OFFICE VISIT (OUTPATIENT)
Age: 24
End: 2024-06-20

## 2024-06-20 VITALS
WEIGHT: 153 LBS | HEIGHT: 63 IN | DIASTOLIC BLOOD PRESSURE: 61 MMHG | OXYGEN SATURATION: 98 % | SYSTOLIC BLOOD PRESSURE: 97 MMHG | TEMPERATURE: 98.6 F | RESPIRATION RATE: 18 BRPM | BODY MASS INDEX: 27.11 KG/M2 | HEART RATE: 93 BPM

## 2024-06-20 DIAGNOSIS — O26.899 VAGINAL DISCHARGE DURING PREGNANCY, ANTEPARTUM: ICD-10-CM

## 2024-06-20 DIAGNOSIS — N89.8 VAGINAL DISCHARGE DURING PREGNANCY, ANTEPARTUM: ICD-10-CM

## 2024-06-20 DIAGNOSIS — Z78.9 NEED FOR FOLLOW-UP BY SOCIAL WORKER: Primary | ICD-10-CM

## 2024-06-20 DIAGNOSIS — R30.0 DYSURIA: ICD-10-CM

## 2024-06-20 DIAGNOSIS — B37.31 YEAST VAGINITIS: ICD-10-CM

## 2024-06-20 DIAGNOSIS — Z34.80 SUPERVISION OF OTHER NORMAL PREGNANCY, ANTEPARTUM: Primary | ICD-10-CM

## 2024-06-20 LAB
BACTERIA, WET MOUNT, POC: NORMAL
BILIRUBIN, URINE, POC: NEGATIVE
BLOOD URINE, POC: NEGATIVE
CLUE CELLS, WET MOUNT, POC: NORMAL
GLUCOSE URINE, POC: NEGATIVE
KETONES, URINE, POC: NEGATIVE
LEUKOCYTE ESTERASE, URINE, POC: NEGATIVE
NITRITE, URINE, POC: NEGATIVE
PH, URINE, POC: 7 (ref 4.6–8)
PROTEIN,URINE, POC: NEGATIVE
RBC WET MOUNT, POC: NEGATIVE
SPECIFIC GRAVITY, URINE, POC: 1.02 (ref 1–1.03)
TRICH, WET MOUNT, POC: NORMAL
URINALYSIS CLARITY, POC: NORMAL
URINALYSIS COLOR, POC: YELLOW
UROBILINOGEN, POC: NORMAL
WBC, WET MOUNT, POC: NORMAL
YEAST, WET MOUNT, POC: NORMAL

## 2024-06-20 PROCEDURE — 87210 SMEAR WET MOUNT SALINE/INK: CPT | Performed by: FAMILY MEDICINE

## 2024-06-20 PROCEDURE — PBSHW AMB POC URINALYSIS DIP STICK AUTO W/O MICRO: Performed by: FAMILY MEDICINE

## 2024-06-20 PROCEDURE — 81003 URINALYSIS AUTO W/O SCOPE: CPT | Performed by: FAMILY MEDICINE

## 2024-06-20 PROCEDURE — PBSHW AMB POC SMEAR, STAIN & INTERPRET, WET MOUNT SC: Performed by: FAMILY MEDICINE

## 2024-06-20 PROCEDURE — 0502F SUBSEQUENT PRENATAL CARE: CPT | Performed by: FAMILY MEDICINE

## 2024-06-20 RX ORDER — CLOTRIMAZOLE 1 %
CREAM WITH APPLICATOR VAGINAL
Qty: 45 G | Refills: 0 | Status: SHIPPED | OUTPATIENT
Start: 2024-06-20 | End: 2024-06-27

## 2024-06-20 ASSESSMENT — PATIENT HEALTH QUESTIONNAIRE - PHQ9
2. FEELING DOWN, DEPRESSED OR HOPELESS: SEVERAL DAYS
1. LITTLE INTEREST OR PLEASURE IN DOING THINGS: SEVERAL DAYS
SUM OF ALL RESPONSES TO PHQ QUESTIONS 1-9: 2
SUM OF ALL RESPONSES TO PHQ9 QUESTIONS 1 & 2: 2

## 2024-06-20 NOTE — PROGRESS NOTES
Susana Navarro is a 23 y.o. female      Chief Complaint   Patient presents with    Routine Prenatal Visit     Patient is 19 and 4 days. She is not having vaginal bleeding. She is having white discharge with odor since a 1 week ago. She is taking her prenatal vitamins. She is having fetal movement. No contractions. She has been having cramps. She states that she has constantly been feeling dizzy since 1 month ago. No other concerns.        \"Have you been to the ER, urgent care clinic since your last visit?  Hospitalized since your last visit?\"    NO    “Have you seen or consulted any other health care providers outside of [facility] since your last visit?”    NO         Vitals:    06/20/24 0903 06/20/24 0910   BP: (!) 90/47 97/61   Site: Right Upper Arm Left Upper Arm   Position: Sitting Sitting   Pulse: 93    Resp: 18    Temp: 98.6 °F (37 °C)    TempSrc: Oral    SpO2: 98%    Weight: 69.4 kg (153 lb)    Height: 1.6 m (5' 3\")             Health Maintenance Due   Topic Date Due    Hepatitis B vaccine (1 of 3 - 3-dose series) Never done    COVID-19 Vaccine (1) Never done    Varicella vaccine (1 of 2 - 2-dose childhood series) Never done    HPV vaccine (1 - 2-dose series) Never done    DTaP/Tdap/Td vaccine (1 - Tdap) Never done    Pap smear  Never done         Medication Reconciliation completed, changes noted.  Please  Update medication list.

## 2024-06-20 NOTE — PROGRESS NOTES
Medicaid follow-up visit with AASHISH.     AASHISH previously assisted patient by contacting [facility] . No response or notification received regarding request/concern.    AASHISH emailed urgent request to RDSS manager, Gigi Dupree, requesting assistance with case; pending response. Patient advised. Next step will be to contact state office should no response be provided by supervisor.    Plan:  Ongoing psychosocial support and resource referral, as desired by the patient.    RILEY Dykes   Navigator

## 2024-06-20 NOTE — PROGRESS NOTES
Chief Complaint   Patient presents with    Routine Prenatal Visit     Patient is 19 and 4 days. She is not having vaginal bleeding. She is having white discharge with odor since a 1 week ago. She is taking her prenatal vitamins. She is having fetal movement. No contractions. She has been having cramps. She states that she has constantly been feeling dizzy since 1 month ago. No other concerns.      Discharge: 1 week/white, not itchy or painful  some pain with urination  Dizziness: feels like almost about to pass out every day  mornings and afternoons  lasts 10-20 minutes  just lays down  ears ringing, some SOB  positional - sitting to standing or when standing long time  getting protein still not quite the same quantity, cold water, not many snacking  not too many headaches    22yo  at 19w4d by L/    IUP: Rh pos  hep B immune  pap at [facility]  genetic testing today  anatomy scheduled    Depression/Anxiety, Insomnia: met with SW  stopped Zoloft (only took once, nausea)  offered alternatives but declined at this time  trial of hydroxyzine for sleep   Nausea/Vomiting/Weight Loss: trial of B6 + phenergan, CMP/TSH ok  improving, will trial Zofran  Vaginal Discharge: consistent with BV - metronidazole  resolved, stopped meds because of nausea  wet prep today with yeast, treat with clotrimazole   Asx Bacteriuria  Dysuria Now: 5000 GNRs, not treated  repeat urine culture NG, repeat U/A and culture     Met with CM/SW, applying for Medicaid - met again today   Estimated Date of Delivery: 11/10/24

## 2024-06-21 ENCOUNTER — TELEPHONE (OUTPATIENT)
Age: 24
End: 2024-06-21

## 2024-06-21 LAB
BACTERIA SPEC CULT: NORMAL
SERVICE CMNT-IMP: NORMAL

## 2024-06-21 NOTE — TELEPHONE ENCOUNTER
Called patient and confirmed name and -     Confirmed details of appt w/ patient for 24 @ 1:45- Asked patient to arrive 10/15 min early and reminded her that she may bring two guest over the age of 12 with her.     Appt will be at [facility] [address] [address] [facility] [id number] [id number].

## 2024-06-24 ENCOUNTER — ROUTINE PRENATAL (OUTPATIENT)
Age: 24
End: 2024-06-24
Payer: MEDICAID

## 2024-06-24 VITALS — DIASTOLIC BLOOD PRESSURE: 55 MMHG | SYSTOLIC BLOOD PRESSURE: 89 MMHG | HEART RATE: 94 BPM

## 2024-06-24 DIAGNOSIS — Z98.890 HISTORY OF HEART SURGERY: Primary | ICD-10-CM

## 2024-06-24 PROCEDURE — 99203 OFFICE O/P NEW LOW 30 MIN: CPT | Performed by: OBSTETRICS & GYNECOLOGY

## 2024-06-24 PROCEDURE — 76811 OB US DETAILED SNGL FETUS: CPT | Performed by: OBSTETRICS & GYNECOLOGY

## 2024-06-24 NOTE — PROCEDURES
PATIENT: DASHAWN HIDALGO   -  : 2000   -  DOS:2024   -  INTERPRETING PROVIDER:Kiel Fitzgerald,   Indication  ========    Heart surgery- cardiac catherization    Method  ======    Transabdominal ultrasound examination. View: Sufficient    Dating  ======    LMP on: 2024  Cycle: regular cycle  GA by LMP 20 w + 1 d  JOSE ELIAS by LMP: 11/10/2024  Previous Ultrasound on: 2024  Type of prior assessment: GA  GA at prior assessment date 11 w + 4 d  GA by previous U/S 20 w + 1 d  JOSE ELIAS by previous Ultrasound: 11/10/2024  Ultrasound examination on: 2024  GA by U/S based upon: AC, BPD, Femur, HC  GA by U/S 19 w + 6 d  JOSE ELIAS by U/S: 2024  Assigned: based on the LMP, selected on 2024  Assigned GA 20 w + 1 d  Assigned JOSE ELIAS: 11/10/2024    Fetal Growth Overview  =================    Exam date        GA              BPD (mm)         HC (mm)              AC (mm)              FL (mm)             HL (mm)             EFW (g)  2024        20w 1d        45     27%         166.7    12%        149.4     46%        32.4    41%        30.6     53%        329    40%    Fetal Biometry  ============    Standard  BPD 45.0 mm 19w 4d 27% Hadlock  OFD 59.1 mm 20w 4d 64% Jeremy  .7 mm 19w 2d 12% Hadlock  Cerebellum tr 21.2 mm 20w 1d 73% Hill  Nuchal fold 2.9 mm  .4 mm 20w 1d 46% Hadlock  Femur 32.4 mm 20w 1d 41% Hadlock  Humerus 30.6 mm 20w 1d 53% Jeremy   g 20w 0d 40% Hadlock  EFW (lb) 0 lb  EFW (oz) 12 oz  EFW by: Hadlock (BPD-HC-AC-FL)  Extended   6.3 mm  CM 3.8 mm  14% Nicolaides  Nasal bone 5.8 mm  Head / Face / Neck  Nasal bone: present  Other Structures   bpm    General Evaluation  ==============    Cardiac activity present.  bpm. Fetal movements: visualized. Presentation: BREECH  Placenta: Placental site: posterior, appropriate distance from the internal os. Placental edge-to-cervical os distance 2.9 cm  Umbilical cord: Cord vessels: 3 vessel cord. Insertion site:

## 2024-06-29 LAB
Lab: NEGATIVE
Lab: NORMAL
NTRA 22Q11.2 DELETION SYNDROME POPULATION-BASED RISK TEXT: NORMAL
NTRA 22Q11.2 DELETION SYNDROME RESULT TEXT: NORMAL
NTRA 22Q11.2 DELETION SYNDROME RISK SCORE TEXT: NORMAL
NTRA ALPHA-THALASSEMIA: NEGATIVE
NTRA BETA-HEMOGLOBINOPATHIES: NEGATIVE
NTRA CANAVAN DISEASE: NEGATIVE
NTRA CYSTIC FIBROSIS: NEGATIVE
NTRA DUCHENNE/BECKER MUSCULAR DYSTROPHY: NEGATIVE
NTRA FAMILIAL DYSAUTONOMIA: NEGATIVE
NTRA FETAL FRACTION: NORMAL
NTRA FRAGILE X SYNDROME: NEGATIVE
NTRA GALACTOSEMIA: NEGATIVE
NTRA GAUCHER DISEASE: NEGATIVE
NTRA GENDER OF FETUS: NORMAL
NTRA MEDIUM CHAIN ACYL-COA DEHYDROGENASE DEFICIENCY: NEGATIVE
NTRA MONOSOMY X AGE-BASED RISK TEXT: NORMAL
NTRA MONOSOMY X RESULT TEXT: NORMAL
NTRA MONOSOMY X RISK SCORE TEXT: NORMAL
NTRA POLYCYSTIC KIDNEY DISEASE, AUTOSOMAL RECESSIVE: NEGATIVE
NTRA SMITH-LEMLI-OPITZ SYNDROME: NEGATIVE
NTRA SPINAL MUSCULAR ATROPHY: NEGATIVE
NTRA TAY-SACHS DISEASE: NEGATIVE
NTRA TRIPLOIDY RESULT TEXT: NORMAL
NTRA TRISOMY 13 AGE-BASED RISK TEXT: NORMAL
NTRA TRISOMY 13 RESULT TEXT: NORMAL
NTRA TRISOMY 13 RISK SCORE TEXT: NORMAL
NTRA TRISOMY 18 AGE-BASED RISK TEXT: NORMAL
NTRA TRISOMY 18 RESULT TEXT: NORMAL
NTRA TRISOMY 18 RISK SCORE TEXT: NORMAL
NTRA TRISOMY 21 AGE-BASED RISK TEXT: NORMAL
NTRA TRISOMY 21 RESULT TEXT: NORMAL
NTRA TRISOMY 21 RISK SCORE TEXT: NORMAL

## 2024-07-12 ENCOUNTER — TELEPHONE (OUTPATIENT)
Age: 24
End: 2024-07-12

## 2024-07-12 NOTE — TELEPHONE ENCOUNTER
Patient reached out to SW Navigator to follow up on Medicaid application.    Per patient has not received any notices or Medicaid card. Patient provided a new 's name, Isi Joyce, [phone].    [facility]ASHISH [id number]. Left message requesting return call.    RILEY Dykes   Navigator

## 2024-07-15 ENCOUNTER — ROUTINE PRENATAL (OUTPATIENT)
Age: 24
End: 2024-07-15
Payer: MEDICAID

## 2024-07-15 VITALS
HEART RATE: 86 BPM | TEMPERATURE: 97.8 F | WEIGHT: 157 LBS | OXYGEN SATURATION: 98 % | SYSTOLIC BLOOD PRESSURE: 89 MMHG | DIASTOLIC BLOOD PRESSURE: 57 MMHG | BODY MASS INDEX: 27.81 KG/M2

## 2024-07-15 DIAGNOSIS — Q24.9 CONGENITAL HEART DISEASE: ICD-10-CM

## 2024-07-15 DIAGNOSIS — O09.90 SUPERVISION OF HIGH RISK PREGNANCY, ANTEPARTUM: Primary | ICD-10-CM

## 2024-07-15 DIAGNOSIS — R30.0 DYSURIA: ICD-10-CM

## 2024-07-15 LAB
BILIRUBIN, URINE, POC: NEGATIVE
BLOOD URINE, POC: NEGATIVE
GLUCOSE URINE, POC: NEGATIVE
KETONES, URINE, POC: NEGATIVE
LEUKOCYTE ESTERASE, URINE, POC: NEGATIVE
NITRITE, URINE, POC: NEGATIVE
PH, URINE, POC: 7.5 (ref 4.6–8)
PROTEIN,URINE, POC: NEGATIVE
SPECIFIC GRAVITY, URINE, POC: 1.01 (ref 1–1.03)
URINALYSIS CLARITY, POC: CLEAR
URINALYSIS COLOR, POC: YELLOW
UROBILINOGEN, POC: NORMAL

## 2024-07-15 PROCEDURE — 81003 URINALYSIS AUTO W/O SCOPE: CPT

## 2024-07-15 PROCEDURE — 0502F SUBSEQUENT PRENATAL CARE: CPT

## 2024-07-15 RX ORDER — PNV NO.95/FERROUS FUM/FOLIC AC 28MG-0.8MG
28 TABLET ORAL DAILY
Qty: 90 TABLET | Refills: 1 | Status: SHIPPED | OUTPATIENT
Start: 2024-07-15

## 2024-07-15 NOTE — PROGRESS NOTES
Identified pt with two pt identifiers(name and ). Reviewed record in preparation for visit and have obtained necessary documentation.  Chief Complaint   Patient presents with    Routine Prenatal Visit     Belly pain x 5 days but not today, baby kicks really hard, vaginal pain         Vitals:    07/15/24 1103   BP: (!) 89/57   Pulse: 86   Temp: 97.8 °F (36.6 °C)   TempSrc: Oral   SpO2: 98%   Weight: 71.2 kg (157 lb)         Coordination of Care Questionnaire:  :     \"Have you been to the ER, urgent care clinic since your last visit?  Hospitalized since your last visit?\"    NO    “Have you seen or consulted any other health care providers outside of [facility] since your last visit?”    NO     “Have you had a pap smear?”        No cervical cancer screening on file             Click Here for Release of Records Request   
Remote precepting. I discussed with the resident the medical history and the resident's findings on physical exam. I discussed with the resident the patient's diagnosis and agree with the plan of care.     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 - mobile clinic came to [facility] when she was young (\"vessel going to place it shouldn't\", had surgery through groin)  saw pediatric cardiology and fetal ECHO okay  maternal ECHO ordered   Depression/Anxiety, Insomnia: doing okay  met with SW  stopped Zoloft (only took once, nausea)  offered alternatives but declined at this time  trial of hydroxyzine for sleep   Nausea/Vomiting/Weight Loss: trial of B6 + phenergan, CMP/TSH ok  improving, doesn't need meds any more   Vaginal Discharge: consistent with BV - metronidazole  resolved, stopped meds because of nausea  wet prep today with yeast, treat with clotrimazole   Asx Bacteriuria  Dysuria Now: 5000 GNRs, not treated  repeat urine culture NG, repeat culture NG  persistent - recheck U/A and culture     Met with CM/SW  Estimated Date of Delivery: 11/10/24  
as well. Informed pt to return to the office or go to the ER if she experiences vaginal bleeding, vaginal discharge, leaking of fluid, pelvic cramping.    Pt seen and discussed with Dr. Dawkins (attending physician)    Dulce Montes MD  Family Medicine Resident

## 2024-07-17 LAB
BACTERIA SPEC CULT: NORMAL
SERVICE CMNT-IMP: NORMAL

## 2024-07-18 LAB
C TRACH RRNA SPEC QL NAA+PROBE: NEGATIVE
N GONORRHOEA RRNA SPEC QL NAA+PROBE: NEGATIVE
SPECIMEN SOURCE: NORMAL

## 2024-07-29 ENCOUNTER — OFFICE VISIT (OUTPATIENT)
Age: 24
End: 2024-07-29

## 2024-07-29 DIAGNOSIS — Z78.9 NEED FOR FOLLOW-UP BY SOCIAL WORKER: Primary | ICD-10-CM

## 2024-07-30 ENCOUNTER — TELEPHONE (OUTPATIENT)
Age: 24
End: 2024-07-30

## 2024-07-30 NOTE — TELEPHONE ENCOUNTER
----- Message from Dulce Montes MD sent at 7/15/2024  1:13 PM EDT -----  Regarding: Schedule apt  Hi Reyna,     Could you schedule an apt with central scheduling for cardiac echo for this patient please.      Thank you,   Dulce

## 2024-07-30 NOTE — TELEPHONE ENCOUNTER
Pt stated that she is waiting to receive full coverage before she schedule the appt, due to being afraid of the bill she previously received. She stated that Luna is assisting her with attempting to get approved for full coverage.

## 2024-07-31 NOTE — PROGRESS NOTES
Medicaid follow-up with AASHISH Navigator.    Patient reports she has not heard from [facility] regarding Medicaid coverage. AASHISH assisted patient by once again contacting , Isi Joyce, via telephone at [phone].  was previously contacted on 7/12/24. No answer, left another message requesting  return call (case #[id number]).    AASHISH also contacted state office, DMAS and VDSS regarding this matter, in an attempt to find resolution to request as multiple attempts have been made. Also left message requesting call back.    Patient advised to go to the [facility] to speak with DSS rep directly, as this may expedite request.    Plan:  Ongoing psychosocial support and resource referral, as desired by the patient and family.      RILEY Dykes   Navigator

## 2024-08-15 ENCOUNTER — ROUTINE PRENATAL (OUTPATIENT)
Age: 24
End: 2024-08-15
Payer: MEDICAID

## 2024-08-15 ENCOUNTER — OFFICE VISIT (OUTPATIENT)
Age: 24
End: 2024-08-15

## 2024-08-15 VITALS
HEIGHT: 63 IN | DIASTOLIC BLOOD PRESSURE: 63 MMHG | OXYGEN SATURATION: 98 % | RESPIRATION RATE: 18 BRPM | WEIGHT: 161 LBS | HEART RATE: 100 BPM | TEMPERATURE: 98.4 F | SYSTOLIC BLOOD PRESSURE: 97 MMHG | BODY MASS INDEX: 28.53 KG/M2

## 2024-08-15 DIAGNOSIS — K21.9 GASTROESOPHAGEAL REFLUX IN PREGNANCY: ICD-10-CM

## 2024-08-15 DIAGNOSIS — Z78.9 NEED FOR FOLLOW-UP BY SOCIAL WORKER: Primary | ICD-10-CM

## 2024-08-15 DIAGNOSIS — O09.90 SUPERVISION OF HIGH RISK PREGNANCY, ANTEPARTUM: Primary | ICD-10-CM

## 2024-08-15 DIAGNOSIS — Q24.9 CONGENITAL HEART DISEASE: ICD-10-CM

## 2024-08-15 DIAGNOSIS — O99.619 GASTROESOPHAGEAL REFLUX IN PREGNANCY: ICD-10-CM

## 2024-08-15 LAB
ERYTHROCYTE [DISTWIDTH] IN BLOOD BY AUTOMATED COUNT: 13.4 % (ref 11.5–14.5)
GLUCOSE 1H P 100 G GLC PO SERPL-MCNC: 129 MG/DL (ref 65–140)
HCT VFR BLD AUTO: 34 % (ref 35–47)
HGB BLD-MCNC: 10.9 G/DL (ref 11.5–16)
MCH RBC QN AUTO: 29.6 PG (ref 26–34)
MCHC RBC AUTO-ENTMCNC: 32.1 G/DL (ref 30–36.5)
MCV RBC AUTO: 92.4 FL (ref 80–99)
NRBC # BLD: 0 K/UL (ref 0–0.01)
NRBC BLD-RTO: 0 PER 100 WBC
PLATELET # BLD AUTO: 293 K/UL (ref 150–400)
PMV BLD AUTO: 10.7 FL (ref 8.9–12.9)
RBC # BLD AUTO: 3.68 M/UL (ref 3.8–5.2)
WBC # BLD AUTO: 8.1 K/UL (ref 3.6–11)

## 2024-08-15 PROCEDURE — 90715 TDAP VACCINE 7 YRS/> IM: CPT | Performed by: FAMILY MEDICINE

## 2024-08-15 RX ORDER — OMEPRAZOLE 20 MG/1
20 CAPSULE, DELAYED RELEASE ORAL NIGHTLY
Qty: 30 CAPSULE | Refills: 3 | Status: SHIPPED | OUTPATIENT
Start: 2024-08-15

## 2024-08-15 ASSESSMENT — PATIENT HEALTH QUESTIONNAIRE - PHQ9
SUM OF ALL RESPONSES TO PHQ QUESTIONS 1-9: 3
SUM OF ALL RESPONSES TO PHQ9 QUESTIONS 1 & 2: 3
SUM OF ALL RESPONSES TO PHQ QUESTIONS 1-9: 3
1. LITTLE INTEREST OR PLEASURE IN DOING THINGS: SEVERAL DAYS
SUM OF ALL RESPONSES TO PHQ QUESTIONS 1-9: 3
2. FEELING DOWN, DEPRESSED OR HOPELESS: MORE THAN HALF THE DAYS
SUM OF ALL RESPONSES TO PHQ QUESTIONS 1-9: 3

## 2024-08-15 NOTE — PROGRESS NOTES
Medicaid follow-up visit.    Patient states has not heard back from [facility] regarding Medicaid prenatal coverage.     SW assisted patient by contacting [facility]  Ms. Joyce at [phone]. Prior attempts to reach worker made. No answer, left detailed message requesting return call.    Per patient went to [facility] office to submit a new change advising of pregnancy and is awaiting response.     Patient will be contacted once [facility] communicates with .    Plan:  Ongoing psychosocial support and resource referral, as desired by the patient and family.      [facility]   Navigator

## 2024-08-15 NOTE — PROGRESS NOTES
Chief Complaint   Patient presents with    Routine Prenatal Visit     Patient is 27 weeks and 4 days. She is not having any vaginal bleeding or discharge. She is taking her prenatal vitamins. She is having fetal movement. No contractions. Patient was having some pelvic pain. She also mentioned she has had a lot of acid reflux. She also noticed she gets a lot of cramps in her legs. No other concerns.      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 - mobile clinic came to [facility] when she was young (\"vessel going to place it shouldn't\", had surgery through groin)  saw pediatric cardiology and fetal ECHO okay  maternal ECHO ordered - waiting on full insurance    Depression/Anxiety, Insomnia: doing okay  met with SW  stopped Zoloft (only took once, nausea)  offered alternatives but declined at this time  trial of hydroxyzine for sleep   Nausea/Vomiting/Weight Loss: trial of B6 + phenergan, CMP/TSH ok  improving, doesn't need meds any more   Vaginal Discharge: consistent with BV - metronidazole  resolved, stopped meds because of nausea  wet prep today with yeast, treat with clotrimazole   Asx Bacteriuria  Dysuria Now: 5000 GNRs, not treated  repeat urine culture NG, repeat culture NG  persistent - recheck culture NG    Desires 3D U/S   Met with KENNETH/AASHISH  Estimated Date of Delivery: 11/10/24

## 2024-08-15 NOTE — PROGRESS NOTES
Susana Navarro is a 24 y.o. female      Chief Complaint   Patient presents with    Routine Prenatal Visit     Patient is 27 weeks and 4 days. She is not having any vaginal bleeding or discharge. She is taking her prenatal vitamins. She is having fetal movement. No contractions. Patient was having some pelvic pain. She also mentioned she has had a lot of acid reflux. She also noticed she gets a lot of cramps in her legs. No other concerns.        \"Have you been to the ER, urgent care clinic since your last visit?  Hospitalized since your last visit?\"    NO    “Have you seen or consulted any other health care providers outside of [facility] since your last visit?”    NO           Vitals:    08/15/24 1012   BP: 97/63   Site: Right Upper Arm   Position: Sitting   Pulse: 100   Resp: 18   Temp: 98.4 °F (36.9 °C)   TempSrc: Oral   SpO2: 98%   Weight: 73 kg (161 lb)   Height: 1.6 m (5' 3\")            Health Maintenance Due   Topic Date Due    Varicella vaccine (1 of 2 - 13+ 2-dose series) Never done    HPV vaccine (1 - 3-dose series) Never done    Hepatitis B vaccine (1 of 3 - 19+ 3-dose series) Never done    DTaP/Tdap/Td vaccine (1 - Tdap) Never done    COVID-19 Vaccine (1 - 2023-24 season) Never done    Flu vaccine (1) Never done    Tdap Vaccine during Pregnancy  08/11/2024         Medication Reconciliation completed, changes noted.  Please  Update medication list.

## 2024-08-16 LAB — T PALLIDUM AB SER QL IA: NON REACTIVE

## 2024-08-29 ENCOUNTER — HOSPITAL ENCOUNTER (OUTPATIENT)
Facility: HOSPITAL | Age: 24
Discharge: HOME | End: 2024-08-31
Attending: FAMILY MEDICINE

## 2024-08-29 VITALS
WEIGHT: 161 LBS | SYSTOLIC BLOOD PRESSURE: 94 MMHG | BODY MASS INDEX: 28.53 KG/M2 | DIASTOLIC BLOOD PRESSURE: 58 MMHG | HEIGHT: 63 IN

## 2024-08-29 DIAGNOSIS — Q24.9 CONGENITAL HEART DISEASE: ICD-10-CM

## 2024-08-29 LAB
ECHO AO ARCH DIAM: 2.1 CM
ECHO AO ASC DIAM: 2.3 CM
ECHO AO ASCENDING AORTA INDEX: 1.31 CM/M2
ECHO AO ROOT DIAM: 2.7 CM
ECHO AO ROOT INDEX: 1.53 CM/M2
ECHO AV AREA PEAK VELOCITY: 2.1 CM2
ECHO AV AREA VTI: 2.2 CM2
ECHO AV AREA/BSA PEAK VELOCITY: 1.2 CM2/M2
ECHO AV AREA/BSA VTI: 1.3 CM2/M2
ECHO AV MEAN GRADIENT: 3 MMHG
ECHO AV MEAN VELOCITY: 0.9 M/S
ECHO AV PEAK GRADIENT: 6 MMHG
ECHO AV PEAK VELOCITY: 1.3 M/S
ECHO AV VELOCITY RATIO: 0.69
ECHO AV VTI: 22.1 CM
ECHO BSA: 1.8 M2
ECHO LA DIAMETER INDEX: 1.76 CM/M2
ECHO LA DIAMETER: 3.1 CM
ECHO LA TO AORTIC ROOT RATIO: 1.15
ECHO LA VOL A-L A2C: 17 ML (ref 22–52)
ECHO LA VOL A-L A4C: 21 ML (ref 22–52)
ECHO LA VOL BP: 18 ML (ref 22–52)
ECHO LA VOL MOD A2C: 17 ML (ref 22–52)
ECHO LA VOL MOD A4C: 19 ML (ref 22–52)
ECHO LA VOL/BSA BIPLANE: 10 ML/M2 (ref 16–34)
ECHO LA VOLUME AREA LENGTH: 20 ML
ECHO LA VOLUME INDEX A-L A2C: 10 ML/M2 (ref 16–34)
ECHO LA VOLUME INDEX A-L A4C: 12 ML/M2 (ref 16–34)
ECHO LA VOLUME INDEX AREA LENGTH: 11 ML/M2 (ref 16–34)
ECHO LA VOLUME INDEX MOD A2C: 10 ML/M2 (ref 16–34)
ECHO LA VOLUME INDEX MOD A4C: 11 ML/M2 (ref 16–34)
ECHO LV E' LATERAL VELOCITY: 15 CM/S
ECHO LV E' SEPTAL VELOCITY: 10 CM/S
ECHO LV EDV A2C: 92 ML
ECHO LV EDV A4C: 88 ML
ECHO LV EDV BP: 90 ML (ref 56–104)
ECHO LV EDV INDEX A4C: 50 ML/M2
ECHO LV EDV INDEX BP: 51 ML/M2
ECHO LV EDV NDEX A2C: 52 ML/M2
ECHO LV EJECTION FRACTION A2C: 58 %
ECHO LV EJECTION FRACTION A4C: 43 %
ECHO LV EJECTION FRACTION BIPLANE: 51 % (ref 55–100)
ECHO LV ESV A2C: 39 ML
ECHO LV ESV A4C: 50 ML
ECHO LV ESV BP: 44 ML (ref 19–49)
ECHO LV ESV INDEX A2C: 22 ML/M2
ECHO LV ESV INDEX A4C: 28 ML/M2
ECHO LV ESV INDEX BP: 25 ML/M2
ECHO LV FRACTIONAL SHORTENING: 26 % (ref 28–44)
ECHO LV INTERNAL DIMENSION DIASTOLE INDEX: 2.67 CM/M2
ECHO LV INTERNAL DIMENSION DIASTOLIC: 4.7 CM (ref 3.9–5.3)
ECHO LV INTERNAL DIMENSION SYSTOLIC INDEX: 1.99 CM/M2
ECHO LV INTERNAL DIMENSION SYSTOLIC: 3.5 CM
ECHO LV IVSD: 0.6 CM (ref 0.6–0.9)
ECHO LV MASS 2D: 85.1 G (ref 67–162)
ECHO LV MASS INDEX 2D: 48.3 G/M2 (ref 43–95)
ECHO LV POSTERIOR WALL DIASTOLIC: 0.6 CM (ref 0.6–0.9)
ECHO LV RELATIVE WALL THICKNESS RATIO: 0.26
ECHO LVOT AREA: 3.1 CM2
ECHO LVOT AV VTI INDEX: 0.72
ECHO LVOT DIAM: 2 CM
ECHO LVOT MEAN GRADIENT: 2 MMHG
ECHO LVOT PEAK GRADIENT: 3 MMHG
ECHO LVOT PEAK VELOCITY: 0.9 M/S
ECHO LVOT STROKE VOLUME INDEX: 28.4 ML/M2
ECHO LVOT SV: 49.9 ML
ECHO LVOT VTI: 15.9 CM
ECHO MV A VELOCITY: 0.57 M/S
ECHO MV E DECELERATION TIME (DT): 154.7 MS
ECHO MV E VELOCITY: 0.74 M/S
ECHO MV E/A RATIO: 1.3
ECHO MV E/E' LATERAL: 4.93
ECHO MV E/E' RATIO (AVERAGED): 6.17
ECHO MV E/E' SEPTAL: 7.4
ECHO PULMONARY ARTERY END DIASTOLIC PRESSURE: 5 MMHG
ECHO PV MAX VELOCITY: 0.9 M/S
ECHO PV PEAK GRADIENT: 3 MMHG
ECHO PV REGURGITANT MAX VELOCITY: 1.1 M/S
ECHO RV FREE WALL PEAK S': 12 CM/S
ECHO RV INTERNAL DIMENSION: 3.3 CM
ECHO RV TAPSE: 2 CM (ref 1.7–?)
ECHO TV REGURGITANT MAX VELOCITY: 2.33 M/S
ECHO TV REGURGITANT PEAK GRADIENT: 22 MMHG

## 2024-08-29 PROCEDURE — 93306 TTE W/DOPPLER COMPLETE: CPT | Performed by: INTERNAL MEDICINE

## 2024-08-29 PROCEDURE — 93306 TTE W/DOPPLER COMPLETE: CPT

## 2024-09-05 ENCOUNTER — OFFICE VISIT (OUTPATIENT)
Age: 24
End: 2024-09-05

## 2024-09-05 ENCOUNTER — ROUTINE PRENATAL (OUTPATIENT)
Age: 24
End: 2024-09-05

## 2024-09-05 VITALS
RESPIRATION RATE: 18 BRPM | BODY MASS INDEX: 28.88 KG/M2 | WEIGHT: 163 LBS | HEART RATE: 97 BPM | SYSTOLIC BLOOD PRESSURE: 87 MMHG | DIASTOLIC BLOOD PRESSURE: 56 MMHG | HEIGHT: 63 IN | OXYGEN SATURATION: 98 % | TEMPERATURE: 98.5 F

## 2024-09-05 DIAGNOSIS — Q24.9 CONGENITAL HEART DISEASE: ICD-10-CM

## 2024-09-05 DIAGNOSIS — O09.90 SUPERVISION OF HIGH RISK PREGNANCY, ANTEPARTUM: Primary | ICD-10-CM

## 2024-09-05 DIAGNOSIS — Z78.9 NEED FOR FOLLOW-UP BY SOCIAL WORKER: Primary | ICD-10-CM

## 2024-09-05 DIAGNOSIS — O99.810 ABNORMAL GLUCOSE AFFECTING PREGNANCY: ICD-10-CM

## 2024-09-05 PROCEDURE — 0502F SUBSEQUENT PRENATAL CARE: CPT | Performed by: FAMILY MEDICINE

## 2024-09-05 NOTE — PROGRESS NOTES
Medicaid follow-up with AASHISH Navigator.    Patient presented notice of action received from [facility] dated 8/8/24 (case #[id number]). Per notice, patient approved for full-coverage as of 8/1/24. AASHISH contacted [facility] to verify coverage. Per automated system, patient's full-coverage effective on 9/1/24 (not 8/1/24 - limited coverage only).     AASHISH assisted patient by sending an email to [facility] , Keily Alas (at [email or web address]) requesting coverage correction back to March 2024, as patient had reported her pregnancy on 3/26/24 via [id number]. A phone message was also left for  at [phone].     Patient concerned about medical bills received prior to effective date. Bill included below:    Service Provider: [facility]  Account #: [id number]  DOS: 6/20/24  Amount: $29.61  Action taken: Patient completed FA application; patient to drop off at [facility]    Service Provider: [facility]  Account #: [id number]  DOS: 4/25/24  Amount: $904.94  Action taken: Patient completed FA application; patient to drop off at [facility]    Service Provider: [employer] [facility]  Account #: [id number]  DOS: 8/29/24  Amount: $126.60  Action Taken: Patient completed FA application; patient to drop off at [facility]    Service Provider: [employer] [facility]  Account #: [id number]  DOS: 9/29/24  Amount: $4221.60  Action Taken: Patient completed FA application; patient to drop off at [facility]    Service Provider: [employer] [facility]  Account #: [id number]  DOS: 5/23/24  Amount: $30.60  Action Taken: Patient completed FA application; patient to drop off at [facility]    Service Provider: [employer] [facility]  Account #: [id number]  DOS: 4/25/24  Amount: $118.80  Action Taken: Patient completed FA application; patient to drop off at [facility]    AASHISH Navigator assisted patient with FA application completion. Patient to take application along with medical bills to [facility] financial assistance department. Patient requesting bill

## 2024-09-19 ENCOUNTER — ROUTINE PRENATAL (OUTPATIENT)
Age: 24
End: 2024-09-19
Payer: MEDICAID

## 2024-09-19 VITALS
OXYGEN SATURATION: 98 % | RESPIRATION RATE: 18 BRPM | TEMPERATURE: 98.4 F | DIASTOLIC BLOOD PRESSURE: 64 MMHG | BODY MASS INDEX: 29.06 KG/M2 | HEART RATE: 96 BPM | WEIGHT: 164 LBS | HEIGHT: 63 IN | SYSTOLIC BLOOD PRESSURE: 99 MMHG

## 2024-09-19 DIAGNOSIS — O09.90 SUPERVISION OF HIGH RISK PREGNANCY, ANTEPARTUM: Primary | ICD-10-CM

## 2024-09-19 DIAGNOSIS — Z23 ENCOUNTER FOR IMMUNIZATION: ICD-10-CM

## 2024-09-19 PROCEDURE — 90661 CCIIV3 VAC ABX FR 0.5 ML IM: CPT | Performed by: FAMILY MEDICINE

## 2024-09-19 ASSESSMENT — PATIENT HEALTH QUESTIONNAIRE - PHQ9
1. LITTLE INTEREST OR PLEASURE IN DOING THINGS: SEVERAL DAYS
SUM OF ALL RESPONSES TO PHQ QUESTIONS 1-9: 2
2. FEELING DOWN, DEPRESSED OR HOPELESS: SEVERAL DAYS
SUM OF ALL RESPONSES TO PHQ QUESTIONS 1-9: 2
SUM OF ALL RESPONSES TO PHQ QUESTIONS 1-9: 2
SUM OF ALL RESPONSES TO PHQ9 QUESTIONS 1 & 2: 2
SUM OF ALL RESPONSES TO PHQ QUESTIONS 1-9: 2

## 2024-10-03 ENCOUNTER — ROUTINE PRENATAL (OUTPATIENT)
Age: 24
End: 2024-10-03
Payer: MEDICAID

## 2024-10-03 VITALS
BODY MASS INDEX: 29.23 KG/M2 | HEART RATE: 105 BPM | SYSTOLIC BLOOD PRESSURE: 91 MMHG | DIASTOLIC BLOOD PRESSURE: 58 MMHG | WEIGHT: 165 LBS | OXYGEN SATURATION: 98 % | RESPIRATION RATE: 18 BRPM | HEIGHT: 63 IN | TEMPERATURE: 98.1 F

## 2024-10-03 DIAGNOSIS — O09.90 SUPERVISION OF HIGH RISK PREGNANCY, ANTEPARTUM: Primary | ICD-10-CM

## 2024-10-03 DIAGNOSIS — R30.0 DYSURIA: ICD-10-CM

## 2024-10-03 LAB
BILIRUBIN, URINE, POC: NEGATIVE
BLOOD URINE, POC: NEGATIVE
GLUCOSE URINE, POC: NEGATIVE
KETONES, URINE, POC: NORMAL
LEUKOCYTE ESTERASE, URINE, POC: NEGATIVE
NITRITE, URINE, POC: NEGATIVE
PH, URINE, POC: 7 (ref 4.6–8)
PROTEIN,URINE, POC: NEGATIVE
SPECIFIC GRAVITY, URINE, POC: 1.01 (ref 1–1.03)
URINALYSIS CLARITY, POC: CLEAR
URINALYSIS COLOR, POC: YELLOW
UROBILINOGEN, POC: NORMAL

## 2024-10-03 PROCEDURE — 81003 URINALYSIS AUTO W/O SCOPE: CPT | Performed by: FAMILY MEDICINE

## 2024-10-03 PROCEDURE — PBSHW AMB POC URINALYSIS DIP STICK AUTO W/O MICRO: Performed by: FAMILY MEDICINE

## 2024-10-03 PROCEDURE — 0502F SUBSEQUENT PRENATAL CARE: CPT | Performed by: FAMILY MEDICINE

## 2024-10-03 ASSESSMENT — PATIENT HEALTH QUESTIONNAIRE - PHQ9
1. LITTLE INTEREST OR PLEASURE IN DOING THINGS: SEVERAL DAYS
SUM OF ALL RESPONSES TO PHQ QUESTIONS 1-9: 2
2. FEELING DOWN, DEPRESSED OR HOPELESS: SEVERAL DAYS
SUM OF ALL RESPONSES TO PHQ QUESTIONS 1-9: 2
SUM OF ALL RESPONSES TO PHQ9 QUESTIONS 1 & 2: 2
SUM OF ALL RESPONSES TO PHQ QUESTIONS 1-9: 2
SUM OF ALL RESPONSES TO PHQ QUESTIONS 1-9: 2

## 2024-10-03 NOTE — PROGRESS NOTES
Chief Complaint   Patient presents with    Routine Prenatal Visit     Patient is 34 weeks and 4 days. She is not having any vaginal bleeding or discharge. She is taking her prenatal vitamins. She is having fetal movement. She has contractions every now and then. She has a burning sensation and bad urine odor. She aid when she does not drink enough water. This started . No other concerns.      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 - mobile clinic came to [facility] when she was young (\"vessel going to place it shouldn't\", had surgery through groin)  saw pediatric cardiology and fetal ECHO okay  maternal ECHO - EF 51%, wnl   Depression/Anxiety, Insomnia: doing okay  met with SW  stopped Zoloft (only took once, nausea)  offered alternatives but declined at this time  trial of hydroxyzine for sleep   Nausea/Vomiting/Weight Loss: trial of B6 + phenergan, CMP/TSH ok  improving  Vaginal Discharge: consistent with BV - metronidazole  resolved, stopped meds because of nausea  f/up wet prep with yeast, treated with clotrimazole   Asx Bacteriuria  Dysuria: 5000 GNRs, not treated  repeat urine culture NG, repeat culture NG  persistent - recheck culture NG  recurrent, check U/A + culture today  Pelvic Pressure: occasional ctx, desired SVE, reviewed R/B - SVE 1/thick/high,  labor precautions reviewed     Went to classes with Miley (2)  Luna - message to order breast pump   Met with CM/AASHISH  Estimated Date of Delivery: 11/10/24

## 2024-10-03 NOTE — PROGRESS NOTES
Susana Navarro is a 24 y.o. female      Chief Complaint   Patient presents with    Routine Prenatal Visit     Patient is 34 weeks and 4 days. She is not having any vaginal bleeding or discharge. She is taking her prenatal vitamins. She is having fetal movement. She has contractions every now and then. She has a burning sensation and bad urine odor. She aid when she does not drink enough water. This started Sunday. No other concerns.        \"Have you been to the ER, urgent care clinic since your last visit?  Hospitalized since your last visit?\"    NO    “Have you seen or consulted any other health care providers outside of [facility] since your last visit?”    NO              Vitals:    10/03/24 0958   BP: (!) 91/58   Site: Right Upper Arm   Position: Sitting   Pulse: (!) 105   Resp: 18   Temp: 98.1 °F (36.7 °C)   TempSrc: Oral   SpO2: 98%   Weight: 74.8 kg (165 lb)   Height: 1.6 m (5' 3\")            Health Maintenance Due   Topic Date Due    Varicella vaccine (1 of 2 - 13+ 2-dose series) Never done    HPV vaccine (1 - 3-dose series) Never done    Hepatitis B vaccine (1 of 3 - 19+ 3-dose series) Never done    COVID-19 Vaccine (1 - 2023-24 season) Never done    Respiratory Syncytial Virus (RSV) Pregnant or age 60 yrs+ (1 - Risk pregnant 1-dose series) Never done         Medication Reconciliation completed, changes noted.  Please  Update medication list.

## 2024-10-05 LAB
BACTERIA SPEC CULT: NORMAL
SERVICE CMNT-IMP: NORMAL

## 2024-10-07 ENCOUNTER — OFFICE VISIT (OUTPATIENT)
Age: 24
End: 2024-10-07

## 2024-10-07 DIAGNOSIS — Z78.9 NEED FOR FOLLOW-UP BY SOCIAL WORKER: Primary | ICD-10-CM

## 2024-10-07 NOTE — PROGRESS NOTES
Resource visit with AASHISH Navigator.    Patient is 24 y.o. pregnant female at 35 weeks of pregnancy. Patient planning on breastfeeding baby. Patient requesting breast pump. AASHISH assisted patient with pump order via [employer]. Order placed and patient advised that vendor will likely contact her for pump choice prior to shipping item.     Patient also inquired on Medicaid coverage correction request made previously. Patient advised that [facility] Carmichael [facility] , notified via email that she is unable to change coverage request due to Cover [employer] approving as of August. AASHISH assisted patient by contacting Ms. Carmichael and leaving a message regarding incorrect denial (duplicate application) regarding the March change request made (application #[id number]). Patient will be contacted should  return call.    Plan:    1) Ongoing psychosocial support and resource referral, as desired by the patient  2) Follow-up with AASHISH regarding medical bills at next visit    RILEY Dykes   Navigator

## 2024-10-09 ENCOUNTER — HOSPITAL ENCOUNTER (OUTPATIENT)
Facility: HOSPITAL | Age: 24
Discharge: HOME | End: 2024-10-09
Attending: FAMILY MEDICINE | Admitting: OBSTETRICS & GYNECOLOGY
Payer: MEDICAID

## 2024-10-09 VITALS — DIASTOLIC BLOOD PRESSURE: 50 MMHG | HEART RATE: 89 BPM | SYSTOLIC BLOOD PRESSURE: 93 MMHG | TEMPERATURE: 97.4 F

## 2024-10-09 LAB
APPEARANCE UR: CLEAR
BACTERIA URNS QL MICRO: NEGATIVE /HPF
BILIRUB UR QL: NEGATIVE
COLOR UR: NORMAL
EPITH CASTS URNS QL MICRO: NORMAL /LPF
GLUCOSE UR STRIP.AUTO-MCNC: NEGATIVE MG/DL
HGB UR QL STRIP: NEGATIVE
HYALINE CASTS URNS QL MICRO: NORMAL /LPF (ref 0–2)
KETONES UR QL STRIP.AUTO: NEGATIVE MG/DL
LEUKOCYTE ESTERASE UR QL STRIP.AUTO: NEGATIVE
NITRITE UR QL STRIP.AUTO: NEGATIVE
PH UR STRIP: 6.5 (ref 5–8)
PROT UR STRIP-MCNC: NEGATIVE MG/DL
RBC #/AREA URNS HPF: NORMAL /HPF (ref 0–5)
SP GR UR REFRACTOMETRY: 1.01 (ref 1–1.03)
URINE CULTURE IF INDICATED: NORMAL
UROBILINOGEN UR QL STRIP.AUTO: 0.2 EU/DL (ref 0.2–1)
WBC URNS QL MICRO: NORMAL /HPF (ref 0–4)

## 2024-10-09 PROCEDURE — G0378 HOSPITAL OBSERVATION PER HR: HCPCS

## 2024-10-09 PROCEDURE — G0379 DIRECT REFER HOSPITAL OBSERV: HCPCS

## 2024-10-09 PROCEDURE — 81001 URINALYSIS AUTO W/SCOPE: CPT

## 2024-10-09 NOTE — PROGRESS NOTES
0530: Patient states that she is having urinary frequency, feeling like she isn't emptying her bladder well, and states that she feels \"Like something is going to come out\". Patient endorses positive fetal movement. Patient declines feeling a large gush, patient declines wearing a pad into the hospital, patient declines leaking of fluid, patient declines vaginal bleeding and patient declines contractions. EFM applied, patient oriented to room.     0540: Dr. Chan bedside for patient evaluation. SVE: 1/10. Orders received to send a UA. Amnisure orders D/C'd as patient is not experiencing leaking of fluid.     0620: Reviewed FHR with JENNIFER Smith MD, NST reactive,  patient OK to be taken off monitor per MD.     0725: Report given to Keily LEO, POC discussed, patient care transferred at this time.

## 2024-10-09 NOTE — DISCHARGE INSTRUCTIONS
HOME DISCHARGE INSTRUCTIONS    Susana Navarro / [id number] : 2000    Admission date: 10/9/2024 Discharge date: 10/9/2024     Please bring this form with you to show your care provider at your follow-up appointment.    Primary care provider:  Miya Bunch MD    Discharging provider:  Lisa Taylor MD  - Family Medicine Resident  Yaya Chan DO - Family Medicine / OB Attending      You have been admitted to the hospital with the following diagnoses:    ACUTE DIAGNOSES:  Urinary frequency  Pregnancy at 35 weeks gestation    You were seen on L&D for urinary frequency. This is most consistent with pressure from your uterus squeezing your bladder. Your urine testing did not show signs of infection. Your vitals were normal and monitoring (NST) of your baby looked healthy. You can use a belly band for support. Return precautions are below, otherwise follow up as scheduled for routine OB care.     . . . . . . . . . . . . . . . . . . . . . . . . . . . . . . . . . . . . . . . . . . . . . . . . . . . . . . . . . . . . . . . . . . . . . . . .   FOLLOW-UP CARE RECOMMENDATIONS:  PCP - Miya Bunch MD  [facility] - Routine OB care next apt 10/17/24 11:00am with Dr. Dawkins      Appointments  Future Appointments   Date Time Provider Department Center   10/16/2024  1:45 PM ULTRASOUND 1 AMB [facility] BS [facility]   10/17/2024 10:30 AM Luna Rashid [facility] DEP   10/17/2024 11:00 AM Marybeth Dawkins DO [facility] DEP   10/24/2024  8:20 AM Marybeth Dawkins DO [facility] DEP   10/31/2024 10:00 AM Marybeth Dawkins DO [facility] DEP       Follow-up tests needed: Routine OB    Pending test results:   At the time of your discharge the following test results are still pending: None.   Please make sure you review these results with your outpatient follow-up provider(s).    DIET/what to eat:  regular diet    ACTIVITY:  activity as tolerated    Wound care: Not Applicable    Equipment needed:

## 2024-10-09 NOTE — PROGRESS NOTES
0705 [facility] rec'd by SHEFALI Freed RN.    0818 Patient discharged home.  Verbalized understanding of instructions.  Patient ambulated off unit with partner.

## 2024-10-09 NOTE — DISCHARGE SUMMARY
Obstetrical Discharge Summary     Name: Susana Navarro MRN: [id number]  SSN: [id number]    YOB: 2000  Age: 24 y.o.  Sex: female      Admit Date: 10/9/2024    Discharge Date: 10/9/2024     Admitting Physician: Bi Huggins MD     Attending Physician:  Yaya Chan DO    Admission Diagnoses: Pregnancy at 35 weeks gesatation  Urinary frequency    Discharge Diagnoses:   Pregnancy at 35 weeks gesatation    Additional Diagnoses:  none    Immunization(s):   Immunization History   Administered Date(s) Administered    Influenza, FLUCELVAX, (age 6 mo+) IM, Trivalent PF, 0.5mL 2024    TDaP, ADACEL (age 10y-64y), BOOSTRIX (age 10y+), IM, 0.5mL 08/15/2024        Hospital Course:   Patient is a 24 y.o.   at 35 weeks 3 days.  Presented for  urinary frequency. Pregnancy complicated by PMHx of CHD repair in childhood, MDD/MICHELLE, BV and asymptomatic bacteriuria treated earlier this pregnancy. Exam unremarkable, patient well appearing. UA with no evidence of infection. Most consistent with compression of bladder by uterus with associated round ligament pain. At time of discharge patient reported improvement in urinary frequency and no complaints, agreeable to plan.  Discharged with return instructions and labor precautions. Advised to continue prenatal vitamins.  Follow up: Next appointment 10/17/24 11am with Dr. Dawkins      Follow up test at discharge: NA  Condition at Discharge:  Stable  Disposition: Discharge to Home    Physical exam:  BP (!) 105/57   Pulse 96   LMP 2024 (Exact Date)     Exam:  Patient without distress.               CTAB, no w/r/r/c               RRR, + S1 and S2, no m/r/g               Abdomen gravid, nontender               Lower extremities are negative for swelling, cords or tenderness.      Patient Instructions:   Current Discharge Medication List        CONTINUE these medications which have NOT CHANGED    Details   Prenatal Vit-Fe Fumarate-FA (PRENATAL

## 2024-10-09 NOTE — H&P
History & Physical    Name: Susana Navarro MRN: [id number]  SSN: [id number]    YOB: 2000  Age: 24 y.o.  Sex: female        Subjective:     Estimated Date of Delivery: 11/10/24  OB History    Para Term  AB Living   3 2 2     2   SAB IAB Ectopic Molar Multiple Live Births             2      # Outcome Date GA Lbr Josh/2nd Weight Sex Type Anes PTL Lv   3 Current            2 Term 2016    M Vag-Spont   MIRZA   1 Term     F Vag-Spont   MIRZA       Ms. Terrance Navarro is a 24 y.o.  seen with pregnancy at 35w3d by LMP, confirmed by 11wUS, for urinary frequency.Patient declines feeling a large gush of fluid.  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 Patient reports good fetal movement. Patient denies ROM. Patient denies changes in vision, headache, fever, CP, SOB, nausea/vomiting, RUQ pain, LE edema, and calf tenderness/pain. No vaginal bleeding or contractions. Please see prenatal records for details.      No past medical history on file.  Past Surgical History:   Procedure Laterality Date    OTHER SURGICAL HISTORY      unclear heart history, had surgery     Social History     Occupational History    Not on file   Tobacco Use    Smoking status: Never    Smokeless tobacco: Never   Vaping Use    Vaping status: Never Used   Substance and Sexual Activity    Alcohol use: Never    Drug use: Never    Sexual activity: Yes     Partners: Male     Family History   Problem Relation Age of Onset    Diabetes Maternal Grandmother     Alzheimer's Disease Maternal Grandmother     Elevated Lipids Maternal Grandmother     Hypertension Maternal Grandmother     Diabetes Maternal Grandfather     Elevated Lipids Mother     Hypertension Mother        No Known Allergies  Prior to Admission medications    Medication Sig Start Date End Date Taking? Authorizing Provider   Prenatal Vit-Fe Fumarate-FA (PRENATAL VITAMINS) 28-0.8 MG TABS Take 1  tablet by mouth daily 7/15/24   Dulce Montes MD        Review of Systems: as above in HPI.    Objective:     Vitals:  There were no vitals filed for this visit.     Physical Exam:  General : no acute distress  CVS: normal rate and rhythm, no murmurs  Lungs: clear to auscultation bilaterally, no wheeze  Abd: Gravid, no tenderness to palpation  Cervical Exam:  SVE: 1/10 at 0540 by Dr Chan, unchanged from clinic  Membranes: Intact  Fetal Heart Rate: Reactive  Baseline: 135 per minute  Variability: moderate  Accelerations: yes  Decelerations: none  Uterine contractions: none  Category 1, monitored for 44 minutes    Prenatal Labs:   PNL:  O+, HIV/RPR/C/GC/Hep B neg, VZV/Rubella immune, CBC ok,  UA/Ucx neg, NIPT neg.   U/S @ 20w 1d: EFW 329g 40%; AC 46%       Assessment/Plan:     Ms. Terrance Navarro is a  seen with pregnancy at 35w3d for urinary frequency .     Urinary frequency at 35w3d. Suspect UTI versus frequency from pregnancy.  Amnisure dc given no leakage of fluid. Not sarah on monitor, category 1 strip VS wnl and well appearing.  - Will follow up on UA result however anticipate UTI and if so will discharge with Abx  - Encourage ambulation and po hydration.   - SVE: 1/10 at 0540 by Dr Chan, unchanged from clinic  - Discharge patient with labor precautions if stable and no signs of progression of labor.   - Follow up outpatient for routine prenatal care  Other:  - Patient does  want an epidural  - Expecting baby girl      Patient seen with Dr. Dustin Taylor MD   Family Medicine Resident

## 2024-10-16 ENCOUNTER — ROUTINE PRENATAL (OUTPATIENT)
Age: 24
End: 2024-10-16
Payer: MEDICAID

## 2024-10-16 VITALS — HEART RATE: 102 BPM | DIASTOLIC BLOOD PRESSURE: 72 MMHG | SYSTOLIC BLOOD PRESSURE: 106 MMHG

## 2024-10-16 DIAGNOSIS — Z3A.36 36 WEEKS GESTATION OF PREGNANCY: Primary | ICD-10-CM

## 2024-10-16 PROCEDURE — 76816 OB US FOLLOW-UP PER FETUS: CPT | Performed by: OBSTETRICS & GYNECOLOGY

## 2024-10-17 ENCOUNTER — OFFICE VISIT (OUTPATIENT)
Age: 24
End: 2024-10-17

## 2024-10-17 ENCOUNTER — ROUTINE PRENATAL (OUTPATIENT)
Age: 24
End: 2024-10-17

## 2024-10-17 VITALS
HEART RATE: 102 BPM | DIASTOLIC BLOOD PRESSURE: 68 MMHG | SYSTOLIC BLOOD PRESSURE: 102 MMHG | OXYGEN SATURATION: 97 % | RESPIRATION RATE: 18 BRPM | WEIGHT: 167 LBS | TEMPERATURE: 98.5 F | BODY MASS INDEX: 29.59 KG/M2 | HEIGHT: 63 IN

## 2024-10-17 DIAGNOSIS — O09.90 SUPERVISION OF HIGH RISK PREGNANCY, ANTEPARTUM: Primary | ICD-10-CM

## 2024-10-17 DIAGNOSIS — Z78.9 NEED FOR FOLLOW-UP BY SOCIAL WORKER: Primary | ICD-10-CM

## 2024-10-17 PROCEDURE — 0502F SUBSEQUENT PRENATAL CARE: CPT | Performed by: FAMILY MEDICINE

## 2024-10-17 RX ORDER — PNV NO.95/FERROUS FUM/FOLIC AC 28MG-0.8MG
28 TABLET ORAL DAILY
Qty: 90 TABLET | Refills: 1 | Status: SHIPPED | OUTPATIENT
Start: 2024-10-17

## 2024-10-17 ASSESSMENT — PATIENT HEALTH QUESTIONNAIRE - PHQ9
SUM OF ALL RESPONSES TO PHQ QUESTIONS 1-9: 2
2. FEELING DOWN, DEPRESSED OR HOPELESS: SEVERAL DAYS
SUM OF ALL RESPONSES TO PHQ9 QUESTIONS 1 & 2: 2
1. LITTLE INTEREST OR PLEASURE IN DOING THINGS: SEVERAL DAYS

## 2024-10-17 NOTE — PROGRESS NOTES
Susana Navarro is a 24 y.o. female      Chief Complaint   Patient presents with    Routine Prenatal Visit     Patient is 36 weeks and 4 days. She is not having any vaginal bleeding or discharge. She is taking her prenatal vitamins. She is having fetal movement. She has been having contractions every now and then.  She mentioned she went to [facility] last wedenesday for diarrhea. No other concerns.        \"Have you been to the ER, urgent care clinic since your last visit?  Hospitalized since your last visit?\"    NO    “Have you seen or consulted any other health care providers outside of [facility] since your last visit?”    NO              Vitals:    10/17/24 1054   BP: 102/68   Site: Right Upper Arm   Position: Sitting   Pulse: (!) 102   Resp: 18   Temp: 98.5 °F (36.9 °C)   TempSrc: Oral   SpO2: 97%   Weight: 75.8 kg (167 lb)   Height: 1.6 m (5' 3\")            Health Maintenance Due   Topic Date Due    Varicella vaccine (1 of 2 - 13+ 2-dose series) Never done    HPV vaccine (1 - 3-dose series) Never done    Hepatitis B vaccine (1 of 3 - 19+ 3-dose series) Never done    COVID-19 Vaccine (1 - 2023-24 season) Never done    Respiratory Syncytial Virus (RSV) Pregnant or age 60 yrs+ (1 - Risk pregnant 1-dose series) Never done         Medication Reconciliation completed, changes noted.  Please  Update medication list.

## 2024-10-17 NOTE — PROCEDURES
PATIENT: DASHAWN HIDALGO   -  : 2000   -  DOS:10/16/2024   -  INTERPRETING PROVIDER:Radha Ames,   Indication  ========    Possible hx maternal CHD    Method  ======    Transabdominal ultrasound examination. View: Suboptimal view: limited by late gestational age    Pregnancy  =========    Henderson pregnancy, henderson pregnancy. Number of fetuses: 1    Dating  ======    LMP on: 2024  Cycle: regular cycle  GA by LMP 36 w + 3 d  JOSE ELIAS by LMP: 11/10/2024  Previous Ultrasound on: 2024  Type of prior assessment: GA  GA at prior assessment date 11 w + 4 d  GA by previous U/S 36 w + 3 d  JOSE ELIAS by previous Ultrasound: 11/10/2024  Ultrasound examination on: 10/16/2024  GA by U/S based upon: AC, BPD, Femur, HC  GA by U/S 37 w + 0 d  JOSE ELIAS by U/S: 2024  Assigned: based on the LMP, selected on 2024  Assigned GA 36 w + 3 d  Assigned JOSE ELIAS: 11/10/2024    Fetal Biometry  ============    Standard  BPD 91.3 mm 37w 0d 77% Hadlock  .1 mm -/- 89% Jeremy  .2 mm 37w 2d 42% Hadlock  .8 mm 37w 5d 89% Hadlock  Femur 69.6 mm 35w 5d 28% Hadlock  EFW 3,116 g 37w 3d 71% Hadlock  EFW (lb) 6 lb  EFW (oz) 14 oz  EFW by: Hadlock (BPD-HC-AC-FL)  Head / Face / Neck  Nasal bone: present  Other Structures   bpm    General Evaluation  ==============    Cardiac activity present.  bpm. Fetal movements: visualized. Presentation: Cephalic  Placenta: Placental site: posterior, appropriate distance from the internal os  Umbilical cord: Cord vessels: 3 vessel cord. Insertion site: central  Amniotic fluid: Amount of AF: normal. MVP 7.0 cm    Fetal Anatomy  ===========    Face  Nasal bone: present  Stomach: normal  Bladder: normal  Wants to know fetal sex: no    Findings  =======    Intrauterine Henderson pregnancy, henderson pregnancy at 36w 3d by clinical dates.  EFW is 3116 g at 71%, abdominal circumference at 89%.  Anatomy visualized as stated above.  Amniotic fluid: normal.  Placenta is

## 2024-10-17 NOTE — PROGRESS NOTES
Bill counseling visit with AASHISH Navigator.    Patient concerned about medical expenses not covered during Medicaid coverage period. Patient has active insurance Medicaid with [employer] as of 9/1/24. Patient also presents approval letter for [employer] FA program; effective 4/1/24 - 11/30/24.      SW advised patient that the FA program should cover any medically necessary visits from 4/1/24 - 8/30/24. Copy of FA approval letter obtained by AASHISH [employer]. Information sent to [employer] billing office for bill write off. Patient also asked about a bill received from [employer]. AASHISH advised patient to apply for [facility]'s financial assistance program; application printed and given to patient during visit.    Plan:  Ongoing psychosocial support and resource referral, as desired by the patient and family.      RILEY Dykes   Navigator

## 2024-10-17 NOTE — PROGRESS NOTES
Chief Complaint   Patient presents with    Routine Prenatal Visit     Patient is 36 weeks and 4 days. She is not having any vaginal bleeding or discharge. She is taking her prenatal vitamins. She is having fetal movement. She has been having contractions every now and then.  She mentioned she went to [facility] last  for diarrhea. No other concerns.      25yo  at 36w4d by L/11    IUP: Rh pos  hep B immune  [facility]  low risk NIPT, Horizon neg   anatomy okay  GTT okay/borderline (129) - 37w0 - EFW 71%, AC 89%, cephalic  HgB 10.9  s/p Tdap, flu  GBS today  History of Maternal CHD: corrected by unknown procedure - mobile clinic came to [facility] when she was young (\"vessel going to place it shouldn't\", had surgery through groin)  saw pediatric cardiology and fetal ECHO okay  maternal ECHO - EF 51%, wnl   Depression/Anxiety, Insomnia: doing okay  met with SW  stopped Zoloft (only took once, nausea)  offered alternatives but declined at this time  trial of hydroxyzine for sleep   Nausea/Vomiting/Weight Loss: trial of B6 + phenergan, CMP/TSH ok  improving  Vaginal Discharge: consistent with BV - metronidazole  resolved, stopped meds because of nausea  f/up wet prep with yeast, treated with clotrimazole   Asx Bacteriuria  Dysuria: 5000 GNRs, not treated  repeat urine culture NG, repeat culture NG  persistent - recheck culture NG  recurrent, check U/A + culture today  Pelvic Pressure: occasional ctx, desired SVE, reviewed R/B - SVE 1/thick/high,  labor precautions reviewed     Went to classes with Miley (2)  Luna - message to order breast pump   Met with KENNETH/AASHISH  Estimated Date of Delivery: 11/10/24

## 2024-10-20 LAB
BACTERIA SPEC CULT: NORMAL
SERVICE CMNT-IMP: NORMAL

## 2024-10-23 NOTE — PROGRESS NOTES
Chief Complaint   Patient presents with    Routine Prenatal Visit     Patient is 37 weeks and 4 days. She is not having any vaginal bleeding. Patient is having thick green discharge since the weekend. She is taking her prenatal vitamins. She is having fetal movement. She has been having 2 contractions a day. No other concerns.      25yo  at 37w4d by L/11    IUP: Rh pos  hep B immune  [facility]  low risk NIPT, Horizon neg   anatomy okay  GTT okay/borderline (129) - 37w0 - EFW 71%, AC 89%, cephalic  HgB 10.9  s/p Tdap, flu  GBS neg  SVE 3  History of Maternal CHD: corrected by unknown procedure - mobile clinic came to [facility] when she was young (\"vessel going to place it shouldn't\", had surgery through groin)  saw pediatric cardiology and fetal ECHO okay  maternal ECHO - EF 51%, wnl   Depression/Anxiety, Insomnia: doing okay  met with SW  stopped Zoloft (only took once, nausea)  offered alternatives but declined at this time  trial of hydroxyzine for sleep   Nausea/Vomiting/Weight Loss: trial of B6 + phenergan, CMP/TSH ok  improving  Vaginal Discharge: consistent with BV - metronidazole  resolved, stopped meds because of nausea  f/up wet prep with yeast, treated with clotrimazole   Asx Bacteriuria  Dysuria: 5000 GNRs, not treated  repeat urine culture NG, repeat culture NG  persistent - recheck culture NG  recurrent, culture NG   Vaginal Discharge: consistent with BV, will treat with Flagyl    Went to classes with Miley (2)  Luna - message to order breast pump   Met with KENNETH/AASHISH  Estimated Date of Delivery: 11/10/24

## 2024-10-24 ENCOUNTER — OFFICE VISIT (OUTPATIENT)
Age: 24
End: 2024-10-24

## 2024-10-24 ENCOUNTER — ROUTINE PRENATAL (OUTPATIENT)
Age: 24
End: 2024-10-24
Payer: MEDICAID

## 2024-10-24 VITALS
WEIGHT: 168 LBS | SYSTOLIC BLOOD PRESSURE: 94 MMHG | TEMPERATURE: 98.1 F | BODY MASS INDEX: 29.77 KG/M2 | OXYGEN SATURATION: 98 % | HEART RATE: 95 BPM | HEIGHT: 63 IN | DIASTOLIC BLOOD PRESSURE: 62 MMHG | RESPIRATION RATE: 18 BRPM

## 2024-10-24 DIAGNOSIS — O26.893 VAGINAL DISCHARGE DURING PREGNANCY IN THIRD TRIMESTER: ICD-10-CM

## 2024-10-24 DIAGNOSIS — O09.90 SUPERVISION OF HIGH RISK PREGNANCY, ANTEPARTUM: Primary | ICD-10-CM

## 2024-10-24 DIAGNOSIS — N89.8 VAGINAL DISCHARGE DURING PREGNANCY IN THIRD TRIMESTER: ICD-10-CM

## 2024-10-24 DIAGNOSIS — Z78.9 NEED FOR FOLLOW-UP BY SOCIAL WORKER: Primary | ICD-10-CM

## 2024-10-24 LAB
BACTERIA, WET MOUNT, POC: NORMAL
CLUE CELLS, WET MOUNT, POC: PRESENT
RBC WET MOUNT, POC: NORMAL
TRICH, WET MOUNT, POC: NORMAL
WBC, WET MOUNT, POC: NORMAL
YEAST, WET MOUNT, POC: NORMAL

## 2024-10-24 PROCEDURE — 87210 SMEAR WET MOUNT SALINE/INK: CPT | Performed by: FAMILY MEDICINE

## 2024-10-24 PROCEDURE — PBSHW AMB POC SMEAR, STAIN & INTERPRET, WET MOUNT SC: Performed by: FAMILY MEDICINE

## 2024-10-24 PROCEDURE — 0502F SUBSEQUENT PRENATAL CARE: CPT | Performed by: FAMILY MEDICINE

## 2024-10-24 ASSESSMENT — PATIENT HEALTH QUESTIONNAIRE - PHQ9
2. FEELING DOWN, DEPRESSED OR HOPELESS: SEVERAL DAYS
SUM OF ALL RESPONSES TO PHQ9 QUESTIONS 1 & 2: 2
SUM OF ALL RESPONSES TO PHQ QUESTIONS 1-9: 2
SUM OF ALL RESPONSES TO PHQ QUESTIONS 1-9: 2
1. LITTLE INTEREST OR PLEASURE IN DOING THINGS: SEVERAL DAYS
SUM OF ALL RESPONSES TO PHQ QUESTIONS 1-9: 2
SUM OF ALL RESPONSES TO PHQ QUESTIONS 1-9: 2

## 2024-10-24 NOTE — PROGRESS NOTES
Resource visit with AASHISH Navigator.    Patient with questions regarding baby monitoring system. Patient reports she received an email from [employer] regarding oximeter devise for infants. SW advised patient that devise is likely not covered by Medicaid insurance and that a medical necessity is likely needed.     SW left message for [employer] inquiring on product. Patient advised that she will be notified of product detail once information is received from rep.    Plan:  Ongoing psychosocial support and resource referral, as desired by the patient and family.      RILEY Dykes   Navigator

## 2024-10-24 NOTE — PROGRESS NOTES
Susana Navarro is a 24 y.o. female      Chief Complaint   Patient presents with    Routine Prenatal Visit     Patient is 37 weeks and 4 days. She is not having any vaginal bleeding. Patient is having thick green discharge since the weekend. She is taking her prenatal vitamins. She is having fetal movement. She has been having 2 contractions a day. No other concerns.        \"Have you been to the ER, urgent care clinic since your last visit?  Hospitalized since your last visit?\"    NO    “Have you seen or consulted any other health care providers outside of [facility] since your last visit?”    NO              Vitals:    10/24/24 0844   BP: 94/62   Site: Right Upper Arm   Position: Sitting   Pulse: 95   Resp: 18   Temp: 98.1 °F (36.7 °C)   TempSrc: Oral   SpO2: 98%   Weight: 76.2 kg (168 lb)   Height: 1.6 m (5' 3\")            Health Maintenance Due   Topic Date Due    HPV vaccine (1 - 3-dose series) Never done    COVID-19 Vaccine (1 - 2023-24 season) Never done         Medication Reconciliation completed, changes noted.  Please  Update medication list.

## 2024-10-28 ENCOUNTER — HOSPITAL ENCOUNTER (OUTPATIENT)
Facility: HOSPITAL | Age: 24
Discharge: HOME | End: 2024-10-29
Attending: FAMILY MEDICINE | Admitting: OBSTETRICS & GYNECOLOGY
Payer: MEDICAID

## 2024-10-28 PROCEDURE — G0378 HOSPITAL OBSERVATION PER HR: HCPCS

## 2024-10-28 PROCEDURE — 59025 FETAL NON-STRESS TEST: CPT

## 2024-10-28 PROCEDURE — G0379 DIRECT REFER HOSPITAL OBSERV: HCPCS

## 2024-10-29 VITALS
RESPIRATION RATE: 16 BRPM | DIASTOLIC BLOOD PRESSURE: 59 MMHG | HEART RATE: 87 BPM | OXYGEN SATURATION: 97 % | TEMPERATURE: 97.9 F | SYSTOLIC BLOOD PRESSURE: 102 MMHG

## 2024-10-29 PROCEDURE — G0378 HOSPITAL OBSERVATION PER HR: HCPCS

## 2024-10-29 PROCEDURE — 99213 OFFICE O/P EST LOW 20 MIN: CPT

## 2024-10-29 RX ORDER — METRONIDAZOLE 500 MG/1
500 TABLET ORAL 2 TIMES DAILY
Qty: 14 TABLET | Refills: 0 | Status: SHIPPED | OUTPATIENT
Start: 2024-10-29 | End: 2024-10-31

## 2024-10-29 NOTE — H&P
[facility] [address] [place] [address]   Office [phone], Fax [phone]      History & Physical    Name: Susana Navarro MRN: [id number]  SSN: [id number]    YOB: 2000  Age: 24 y.o.  Sex: female      Subjective:     Reason for Admission:  Pregnancy and Contractions    History of Present Illness: Ms. Terrance Navarro is a 24 y.o.   female with an estimated gestational age of 38w1d with Estimated Date of Delivery: 11/10/24. Patient complains of contractions every 5 min lasting 1 min that take her breath away since 5pm. Pregnancy has been complicated by hx maternal CHD repair, nausea/vomiting, asymptomatic bacteriuria. Patient denies abdominal pain  , chest pain, headache , right upper quadrant pain  , shortness of breath, swelling, vaginal bleeding , vaginal leaking of fluid , and visual disturbances. Endorses good fetal movement.    OB History    Para Term  AB Living   3 2 2     2   SAB IAB Ectopic Molar Multiple Live Births             2      # Outcome Date GA Lbr Josh/2nd Weight Sex Type Anes PTL Lv   3 Current            2 Term 2016    M Vag-Spont   MIRZA   1 Term     F Vag-Spont   MIRZA     History reviewed. No pertinent past medical history.  Past Surgical History:   Procedure Laterality Date    OTHER SURGICAL HISTORY      unclear heart history, had surgery     Social History     Occupational History    Not on file   Tobacco Use    Smoking status: Never    Smokeless tobacco: Never   Vaping Use    Vaping status: Never Used   Substance and Sexual Activity    Alcohol use: Never    Drug use: Never    Sexual activity: Yes     Partners: Male      Family History   Problem Relation Age of Onset    Diabetes Maternal Grandmother     Alzheimer's Disease Maternal Grandmother     Elevated Lipids Maternal Grandmother     Hypertension Maternal Grandmother     Diabetes Maternal Grandfather     Elevated Lipids Mother     Hypertension

## 2024-10-29 NOTE — PROGRESS NOTES
Received order to Discharge Pt. AVS printed and reviewed. Pt educated on when to call MD for further signs of labor. VSS. FHT WNL. No other concerns noted.  Vitals:    10/29/24 0058   BP: (!) 102/59   Pulse: 87   Resp: 16   Temp: 97.9 °F (36.6 °C)   SpO2: 97%

## 2024-10-29 NOTE — PROGRESS NOTES
Brief progress note:    Patient seen and examined 2h after last SVE. Symptoms unchanged but resting peacefully in bed. SVE unchanged. Strep remains Cat 1. At this time suspect false labor vs very early labor. Labor precautions discussed, encouraged rest, hydration, and ambulation.     Patient seen and discussed with Bakari Lott CNM.    Lisa Taylor MD   Family Medicine Resident

## 2024-10-31 ENCOUNTER — ROUTINE PRENATAL (OUTPATIENT)
Age: 24
End: 2024-10-31

## 2024-10-31 VITALS
SYSTOLIC BLOOD PRESSURE: 91 MMHG | OXYGEN SATURATION: 98 % | BODY MASS INDEX: 29.77 KG/M2 | WEIGHT: 168 LBS | HEART RATE: 88 BPM | DIASTOLIC BLOOD PRESSURE: 56 MMHG | TEMPERATURE: 97.6 F | HEIGHT: 63 IN

## 2024-10-31 DIAGNOSIS — O09.90 SUPERVISION OF HIGH RISK PREGNANCY, ANTEPARTUM: Primary | ICD-10-CM

## 2024-10-31 ASSESSMENT — PATIENT HEALTH QUESTIONNAIRE - PHQ9
SUM OF ALL RESPONSES TO PHQ QUESTIONS 1-9: 2
SUM OF ALL RESPONSES TO PHQ9 QUESTIONS 1 & 2: 2
2. FEELING DOWN, DEPRESSED OR HOPELESS: SEVERAL DAYS
SUM OF ALL RESPONSES TO PHQ QUESTIONS 1-9: 2
1. LITTLE INTEREST OR PLEASURE IN DOING THINGS: SEVERAL DAYS

## 2024-10-31 NOTE — PROGRESS NOTES
Chief Complaint   Patient presents with    Routine Prenatal Visit     Patient is 38 weeks and 4 days. She is not having any vaginal bleeding or discharge. She is taking her prenatal vitamins. She is having fetal movement. No contractions. She is having pat pan. Patient went to the hospital and was told she was only 1 cm dilated. No other concerns.      23yo  at 38w4d by L/11    IUP: Rh pos  hep B immune  [facility]  low risk NIPT, Horizon neg   anatomy okay  GTT okay/borderline (129) - 37w0 - EFW 71%, AC 89%, cephalic  HgB 10.9  s/p Tdap, flu  GBS neg  SVE /-3  History of Maternal CHD: corrected by unknown procedure - mobile clinic came to [facility] when she was young (\"vessel going to place it shouldn't\", had surgery through groin)  saw pediatric cardiology and fetal ECHO okay  maternal ECHO - EF 51%, wnl   Depression/Anxiety, Insomnia: doing okay  met with AASHISH  stopped Zoloft (only took once, nausea)  offered alternatives but declined at this time  trial of hydroxyzine for sleep   Nausea/Vomiting/Weight Loss: trial of B6 + phenergan, CMP/TSH ok  improving  Vaginal Discharge: consistent with BV - metronidazole  resolved, stopped meds because of nausea  f/up wet prep with yeast, treated with clotrimazole   Asx Bacteriuria  Dysuria: 5000 GNRs, not treated  repeat urine culture NG, repeat culture NG  persistent - recheck culture NG  recurrent, culture NG   Vaginal Discharge: treated with Flagyl     Went to classes with Miley (2)  Luna - message to order breast pump   Met with KENNETH/AASHISH  Estimated Date of Delivery: 11/10/24

## 2024-10-31 NOTE — PROGRESS NOTES
Susana Navarro is a 24 y.o. female      Chief Complaint   Patient presents with    Routine Prenatal Visit     Patient is 38 weeks and 4 days. She is not having any vaginal bleeding or discharge. She is taking her prenatal vitamins. She is having fetal movement. No contractions. She is having pat pna. Patient went to the hospital and was told she was only 1 cm dilated. No other concerns.        \"Have you been to the ER, urgent care clinic since your last visit?  Hospitalized since your last visit?\"    NO    “Have you seen or consulted any other health care providers outside of [facility] since your last visit?”    NO           Vitals:    10/31/24 1010   BP: (!) 91/56   Site: Right Upper Arm   Position: Sitting   Pulse: 88   Temp: 97.6 °F (36.4 °C)   TempSrc: Oral   SpO2: 98%   Weight: 76.2 kg (168 lb)   Height: 1.6 m (5' 3\")            Health Maintenance Due   Topic Date Due    HPV vaccine (1 - 3-dose series) Never done    COVID-19 Vaccine (1 - 2023-24 season) Never done         Medication Reconciliation completed, changes noted.  Please  Update medication list.

## 2024-11-05 DIAGNOSIS — N89.8 VAGINAL DISCHARGE DURING PREGNANCY IN THIRD TRIMESTER: Primary | ICD-10-CM

## 2024-11-05 DIAGNOSIS — O26.893 VAGINAL DISCHARGE DURING PREGNANCY IN THIRD TRIMESTER: Primary | ICD-10-CM

## 2024-11-05 DIAGNOSIS — O09.90 SUPERVISION OF HIGH RISK PREGNANCY, ANTEPARTUM: ICD-10-CM

## 2024-11-05 RX ORDER — METRONIDAZOLE 500 MG/1
500 TABLET ORAL 2 TIMES DAILY
Qty: 14 TABLET | Refills: 0 | Status: SHIPPED | OUTPATIENT
Start: 2024-11-05 | End: 2024-11-12

## 2024-11-05 RX ORDER — PNV NO.95/FERROUS FUM/FOLIC AC 28MG-0.8MG
28 TABLET ORAL DAILY
Qty: 90 TABLET | Refills: 1 | Status: SHIPPED | OUTPATIENT
Start: 2024-11-05

## 2024-11-05 NOTE — TELEPHONE ENCOUNTER
Patient called stating that she needed prescriptions for the Prenatal Vitamin and Metronidazole 500 mg sent to the pharmacy again, because she waited to long to  the medications and now they are unable to fill the medications. [facility] [address]. Patient would also like to be contacted once prescriptions have been sent.    Thanks!

## 2024-11-05 NOTE — TELEPHONE ENCOUNTER
Medication Refill Request: Please see previous message of when she called.     Susana Navarro is requesting a refill of the following medication(s):   Requested Prescriptions     Pending Prescriptions Disp Refills    Prenatal Vit-Fe Fumarate-FA (PRENATAL VITAMINS) 28-0.8 MG TABS 90 tablet 1     Sig: Take 1 tablet by mouth daily    metroNIDAZOLE (FLAGYL) 500 MG tablet 14 tablet 0     Sig: Take 1 tablet by mouth 2 times daily for 7 days        Listed PCP is Miya Bunch APRN - SELENE   Last provider to prescribe medication: Juancho  Last Date of Medication Prescribed: 10/17/2024  Date of Last Office Visit at [facility]: 10/17/2024  FUTURE APPOINTMENT:   Future Appointments   Date Time Provider Department Center   11/7/2024 10:40 AM Marybeth Dakwins, DO [facility] DEP       Please send refill to:    [facility] [id number] [place], [place] - [address] -  [phone] - F [phone]  [address] [facility] [address]  Phone: [phone] Fax: [phone]      Please review request and approve or deny with recommendations.

## 2024-11-07 ENCOUNTER — ROUTINE PRENATAL (OUTPATIENT)
Age: 24
End: 2024-11-07

## 2024-11-07 ENCOUNTER — OFFICE VISIT (OUTPATIENT)
Age: 24
End: 2024-11-07

## 2024-11-07 VITALS
HEART RATE: 110 BPM | WEIGHT: 171.8 LBS | SYSTOLIC BLOOD PRESSURE: 92 MMHG | DIASTOLIC BLOOD PRESSURE: 58 MMHG | HEIGHT: 63 IN | BODY MASS INDEX: 30.44 KG/M2 | OXYGEN SATURATION: 98 % | RESPIRATION RATE: 16 BRPM | TEMPERATURE: 98.2 F

## 2024-11-07 DIAGNOSIS — O09.90 SUPERVISION OF HIGH RISK PREGNANCY, ANTEPARTUM: Primary | ICD-10-CM

## 2024-11-07 DIAGNOSIS — Z78.9 NEED FOR FOLLOW-UP BY SOCIAL WORKER: Primary | ICD-10-CM

## 2024-11-07 PROCEDURE — 0502F SUBSEQUENT PRENATAL CARE: CPT | Performed by: FAMILY MEDICINE

## 2024-11-07 ASSESSMENT — PATIENT HEALTH QUESTIONNAIRE - PHQ9
SUM OF ALL RESPONSES TO PHQ9 QUESTIONS 1 & 2: 2
3. TROUBLE FALLING OR STAYING ASLEEP: MORE THAN HALF THE DAYS
4. FEELING TIRED OR HAVING LITTLE ENERGY: NEARLY EVERY DAY
10. IF YOU CHECKED OFF ANY PROBLEMS, HOW DIFFICULT HAVE THESE PROBLEMS MADE IT FOR YOU TO DO YOUR WORK, TAKE CARE OF THINGS AT HOME, OR GET ALONG WITH OTHER PEOPLE: SOMEWHAT DIFFICULT
SUM OF ALL RESPONSES TO PHQ QUESTIONS 1-9: 2
SUM OF ALL RESPONSES TO PHQ QUESTIONS 1-9: 2
1. LITTLE INTEREST OR PLEASURE IN DOING THINGS: SEVERAL DAYS
SUM OF ALL RESPONSES TO PHQ QUESTIONS 1-9: 11
5. POOR APPETITE OR OVEREATING: NEARLY EVERY DAY
SUM OF ALL RESPONSES TO PHQ QUESTIONS 1-9: 11
SUM OF ALL RESPONSES TO PHQ QUESTIONS 1-9: 2
SUM OF ALL RESPONSES TO PHQ QUESTIONS 1-9: 2
SUM OF ALL RESPONSES TO PHQ9 QUESTIONS 1 & 2: 2
SUM OF ALL RESPONSES TO PHQ QUESTIONS 1-9: 11
8. MOVING OR SPEAKING SO SLOWLY THAT OTHER PEOPLE COULD HAVE NOTICED. OR THE OPPOSITE, BEING SO FIGETY OR RESTLESS THAT YOU HAVE BEEN MOVING AROUND A LOT MORE THAN USUAL: SEVERAL DAYS
2. FEELING DOWN, DEPRESSED OR HOPELESS: SEVERAL DAYS
SUM OF ALL RESPONSES TO PHQ QUESTIONS 1-9: 11
7. TROUBLE CONCENTRATING ON THINGS, SUCH AS READING THE NEWSPAPER OR WATCHING TELEVISION: NOT AT ALL
1. LITTLE INTEREST OR PLEASURE IN DOING THINGS: SEVERAL DAYS
2. FEELING DOWN, DEPRESSED OR HOPELESS: SEVERAL DAYS
9. THOUGHTS THAT YOU WOULD BE BETTER OFF DEAD, OR OF HURTING YOURSELF: NOT AT ALL
6. FEELING BAD ABOUT YOURSELF - OR THAT YOU ARE A FAILURE OR HAVE LET YOURSELF OR YOUR FAMILY DOWN: NOT AT ALL

## 2024-11-07 NOTE — PROGRESS NOTES
Medicaid follow-up visit.    Patient present recent notice dated 10/24/24 received from [facility] DSS. Notice indicates that Medicaid coverage has been extended to reflect coverage as of  3/1/24 (as patient had applied in March/2024) and coverage started months later.     AASHISH verified earlier coverage utilizing information on the document provided. Per DMAS coverage for the months of March - July are for limited coverage only even though the documentation states \"full-coverage\".     Case Info:  Case #: [id number]  : GLENDA Coronel  Tel. #: [phone]    AASHISH assisted patient by contacting , GLENDA Coronel.  Worker did not answer call so a detailed message was left for Ms. Coronel requesting a return call.  Patient advised that she too can contact  to check on bridging issue with her case. Patient understands that Brian Kaba  Navigator can assist with communicating with RDSS; however, corrections and changes can only be done by DSS.    Plan:  Ongoing psychosocial support and resource referral, as desired by the patient and family.      Luna Rashid [facility]   Navigator

## 2024-11-07 NOTE — PROGRESS NOTES
Chief Complaint   Patient presents with    Routine Prenatal Visit     Have you been able to feel fetal movement? - yes  Bleeding? - no  Loss of Fluids?- no   Discharge? - yes due to current infection   Contractions? - yes ..declines timing them     No concerns noted.      Dizziness - a couple days, happens after breakfast, eggs/plaintains, doesn't last long 1-2 minutes  no headaches  Tachycardia - no palpitations    23yo  at 39w4d by L/11    IUP: Rh pos  hep B immune  [facility]  low risk NIPT, Horizon neg   anatomy okay  GTT okay/borderline (129) - 37w0 - EFW 71%, AC 89%, cephalic  HgB 10.9  s/p Tdap, flu  GBS neg  last SVE /-2  plan for PDIOL at 41w (need to schedule)  History of Maternal CHD: corrected by unknown procedure - mobile clinic came to [facility] when she was young (\"vessel going to place it shouldn't\", had surgery through groin)  saw pediatric cardiology and fetal ECHO okay  maternal ECHO - EF 51%, wnl   Depression/Anxiety, Insomnia: doing okay  met with SW  stopped Zoloft (only took once, nausea)  offered alternatives but declined at this time  trial of hydroxyzine for sleep   Nausea/Vomiting/Weight Loss: trial of B6 + phenergan, CMP/TSH ok  improving  Vaginal Discharge: consistent with BV - metronidazole  resolved, stopped meds because of nausea  f/up wet prep with yeast, treated with clotrimazole   Asx Bacteriuria  Dysuria: 5000 GNRs, not treated  repeat urine culture NG, repeat culture NG  persistent - recheck culture NG  recurrent, culture NG   Vaginal Discharge: treated with Flagyl   Maternal Tachycardia: improved with rest and fluids     Went to classes with Miley (2)  Luna - messaged to order breast pump   Met with CM/AASHISH  Estimated Date of Delivery: 11/10/24

## 2024-11-07 NOTE — PROGRESS NOTES
Susana Navarro is a 24 y.o. female      Chief Complaint   Patient presents with    Routine Prenatal Visit     Have you been able to feel fetal movement? - yes  Bleeding? - no  Loss of Fluids?- no   Discharge? - yes due to current infection   Contractions? - yes ..declines timing them     No concerns noted.        \"Have you been to the ER, urgent care clinic since your last visit?  Hospitalized since your last visit?\"    NO    “Have you seen or consulted any other health care providers outside of [facility] since your last visit?”    NO              Vitals:    11/07/24 1100   BP: (!) 92/58   Site: Right Upper Arm   Position: Sitting   Cuff Size: Medium Adult   Pulse: (!) 110   Resp: 16   Temp: 98.2 °F (36.8 °C)   TempSrc: Oral   SpO2: 98%   Weight: 77.9 kg (171 lb 12.8 oz)   Height: 1.6 m (5' 3\")            Health Maintenance Due   Topic Date Due    HPV vaccine (1 - 3-dose series) Never done    COVID-19 Vaccine (1 - 2023-24 season) Never done         Medication Reconciliation completed, changes noted.  Please  Update medication list.

## 2024-11-10 ENCOUNTER — PATIENT MESSAGE (OUTPATIENT)
Age: 24
End: 2024-11-10

## 2024-11-11 ENCOUNTER — HOSPITAL ENCOUNTER (OUTPATIENT)
Facility: HOSPITAL | Age: 24
Discharge: HOME | End: 2024-11-11
Attending: FAMILY MEDICINE | Admitting: FAMILY MEDICINE
Payer: MEDICAID

## 2024-11-11 ENCOUNTER — TELEPHONE (OUTPATIENT)
Age: 24
End: 2024-11-11

## 2024-11-11 ENCOUNTER — HOSPITAL ENCOUNTER (EMERGENCY)
Facility: HOSPITAL | Age: 24
Discharge: HOME | DRG: 560 | End: 2024-11-11
Attending: EMERGENCY MEDICINE
Payer: MEDICAID

## 2024-11-11 VITALS
DIASTOLIC BLOOD PRESSURE: 54 MMHG | HEART RATE: 94 BPM | OXYGEN SATURATION: 99 % | TEMPERATURE: 98.3 F | RESPIRATION RATE: 18 BRPM | SYSTOLIC BLOOD PRESSURE: 102 MMHG

## 2024-11-11 VITALS
SYSTOLIC BLOOD PRESSURE: 112 MMHG | RESPIRATION RATE: 16 BRPM | DIASTOLIC BLOOD PRESSURE: 70 MMHG | OXYGEN SATURATION: 98 % | HEART RATE: 97 BPM | TEMPERATURE: 98 F

## 2024-11-11 DIAGNOSIS — G24.5 EYE TWITCH: Primary | ICD-10-CM

## 2024-11-11 DIAGNOSIS — G51.0 BELL'S PALSY: ICD-10-CM

## 2024-11-11 LAB
ALBUMIN SERPL-MCNC: 2.8 G/DL (ref 3.5–5)
ALBUMIN/GLOB SERPL: 0.6 (ref 1.1–2.2)
ALP SERPL-CCNC: 222 U/L (ref 45–117)
ALT SERPL-CCNC: 18 U/L (ref 12–78)
ANION GAP SERPL CALC-SCNC: 6 MMOL/L (ref 2–12)
AST SERPL-CCNC: 16 U/L (ref 15–37)
BASOPHILS # BLD: 0 K/UL (ref 0–0.1)
BASOPHILS NFR BLD: 0 % (ref 0–1)
BILIRUB SERPL-MCNC: 0.2 MG/DL (ref 0.2–1)
BUN SERPL-MCNC: 9 MG/DL (ref 6–20)
BUN/CREAT SERPL: 16 (ref 12–20)
CALCIUM SERPL-MCNC: 9 MG/DL (ref 8.5–10.1)
CHLORIDE SERPL-SCNC: 107 MMOL/L (ref 97–108)
CO2 SERPL-SCNC: 25 MMOL/L (ref 21–32)
CREAT SERPL-MCNC: 0.58 MG/DL (ref 0.55–1.02)
DIFFERENTIAL METHOD BLD: ABNORMAL
EOSINOPHIL # BLD: 0 K/UL (ref 0–0.4)
EOSINOPHIL NFR BLD: 1 % (ref 0–7)
ERYTHROCYTE [DISTWIDTH] IN BLOOD BY AUTOMATED COUNT: 13.6 % (ref 11.5–14.5)
GLOBULIN SER CALC-MCNC: 4.6 G/DL (ref 2–4)
GLUCOSE SERPL-MCNC: 81 MG/DL (ref 65–100)
HCT VFR BLD AUTO: 34.4 % (ref 35–47)
HGB BLD-MCNC: 11.7 G/DL (ref 11.5–16)
IMM GRANULOCYTES # BLD AUTO: 0 K/UL (ref 0–0.04)
IMM GRANULOCYTES NFR BLD AUTO: 1 % (ref 0–0.5)
LYMPHOCYTES # BLD: 2.1 K/UL (ref 0.8–3.5)
LYMPHOCYTES NFR BLD: 28 % (ref 12–49)
MAGNESIUM SERPL-MCNC: 2 MG/DL (ref 1.6–2.4)
MCH RBC QN AUTO: 29.8 PG (ref 26–34)
MCHC RBC AUTO-ENTMCNC: 34 G/DL (ref 30–36.5)
MCV RBC AUTO: 87.5 FL (ref 80–99)
MONOCYTES # BLD: 0.8 K/UL (ref 0–1)
MONOCYTES NFR BLD: 10 % (ref 5–13)
NEUTS SEG # BLD: 4.5 K/UL (ref 1.8–8)
NEUTS SEG NFR BLD: 60 % (ref 32–75)
NRBC # BLD: 0 K/UL (ref 0–0.01)
NRBC BLD-RTO: 0 PER 100 WBC
PLATELET # BLD AUTO: 313 K/UL (ref 150–400)
PMV BLD AUTO: 10.5 FL (ref 8.9–12.9)
POTASSIUM SERPL-SCNC: 3.9 MMOL/L (ref 3.5–5.1)
PROT SERPL-MCNC: 7.4 G/DL (ref 6.4–8.2)
RBC # BLD AUTO: 3.93 M/UL (ref 3.8–5.2)
SODIUM SERPL-SCNC: 138 MMOL/L (ref 136–145)
WBC # BLD AUTO: 7.5 K/UL (ref 3.6–11)

## 2024-11-11 PROCEDURE — 36415 COLL VENOUS BLD VENIPUNCTURE: CPT

## 2024-11-11 PROCEDURE — 85025 COMPLETE CBC W/AUTO DIFF WBC: CPT

## 2024-11-11 PROCEDURE — 80053 COMPREHEN METABOLIC PANEL: CPT

## 2024-11-11 PROCEDURE — G0379 DIRECT REFER HOSPITAL OBSERV: HCPCS

## 2024-11-11 PROCEDURE — 99283 EMERGENCY DEPT VISIT LOW MDM: CPT

## 2024-11-11 PROCEDURE — G0378 HOSPITAL OBSERVATION PER HR: HCPCS

## 2024-11-11 PROCEDURE — 83735 ASSAY OF MAGNESIUM: CPT

## 2024-11-11 ASSESSMENT — PAIN - FUNCTIONAL ASSESSMENT: PAIN_FUNCTIONAL_ASSESSMENT: NONE - DENIES PAIN

## 2024-11-11 NOTE — ED TRIAGE NOTES
Pt complains of left eye twitching and water coming out of her mouth when she drinks. Symptoms started 5 days ago. Pt started taking a yeast infection pill 4 days ago.     Pt is 40 weeks pregnant and her OB sent her here.

## 2024-11-11 NOTE — PROGRESS NOTES
1455- pt arrived ambulatory to unit with complaints of left eye twitching and left sided facial weakness x5 days. She reported she contacted her OB who advised her to go to the ER. ER sent her to L&D. Pt has positive fetal movement, denies leaking of fluid, headache, epigastric pain and vaginal bleeding.  Placed pt on EFM and notified provider of arrival.    1517- Gordon SOSA at bedside to assess pt. MD cleared pt to be assessed in ER for neuro workup.     1533- NST reactive and verified with charge RN.    1540- Pt transported via wheelchair by RN to ER triage.

## 2024-11-11 NOTE — ED PROVIDER NOTES
[facility] EMERGENCY DEPT  EMERGENCY DEPARTMENT ENCOUNTER      Pt Name: Susana Navarro  MRN: [id number]  Birthdate 2000  Date of evaluation: 11/11/2024  Provider: Star Eduardo MD      HISTORY OF PRESENT ILLNESS      [facility]  24-year-old female who is 40 weeks pregnant presenting to the emergency department due to the left eye twitching and problems with her face.  She says over the last 5 days she has felt intermittent twitching in her left eye.  Sometimes she feels like water is coming out of her mouth when she drinks and brushes her teeth but this does not happen always.  She says sometimes she feels like she is biting her inner lip.  Denies any numbness in her face.  No headache.  No visual changes.  No numbness or weakness in the extremities.  No dizziness or difficulty walking.  Denies pregnancy related complaints.  No leakage of fluid or vaginal bleeding.  Endorses normal fetal movement.  Was sent here from L&D      Nursing Notes were reviewed.    REVIEW OF SYSTEMS         Review of Systems  All systems reviewed were negative unless otherwise document in the [facility]      PAST MEDICAL HISTORY   No past medical history on file.      SURGICAL HISTORY       Past Surgical History:   Procedure Laterality Date    OTHER SURGICAL HISTORY  2007    unclear heart history, had surgery         CURRENT MEDICATIONS       Previous Medications    METRONIDAZOLE (FLAGYL) 500 MG TABLET    Take 1 tablet by mouth 2 times daily for 7 days    PRENATAL VIT-FE FUMARATE-FA (PRENATAL VITAMINS) 28-0.8 MG TABS    Take 1 tablet by mouth daily       ALLERGIES     Patient has no known allergies.    FAMILY HISTORY       Family History   Problem Relation Age of Onset    Diabetes Maternal Grandmother     Alzheimer's Disease Maternal Grandmother     Elevated Lipids Maternal Grandmother     Hypertension Maternal Grandmother     Diabetes Maternal Grandfather     Elevated Lipids Mother     Hypertension Mother           SOCIAL HISTORY     No sensory deficits.  Ambulates with a normal gait.  No dysmetria             EMERGENCY DEPARTMENT COURSE and DIFFERENTIAL DIAGNOSIS/MDM:   Vitals:    Vitals:    11/11/24 1627   BP: 112/70   Pulse: 97   Resp: 16   Temp: 98 °F (36.7 °C)   TempSrc: Oral   SpO2: 98%         Medical Decision Making  Amount and/or Complexity of Data Reviewed  Labs: ordered.      Patient presenting with the above chief complaint.  Vital signs are stable.  I do not appreciate any obvious facial droop on exam.  She may have a slight inability to raise her left eyebrow compared to the right but this seems intermittent.  It is not indicative of a stroke towards the rest of her exam.  Maybe she has the slightest of Bell's palsy.  Considering she has had symptoms for 5 days and she is 40 weeks pregnant I do not think it would be prudent to treat this as the symptoms are very mild and likely to resolve on their own.  Given the twitching I am going to get basic labs to screen for electrolyte abnormalities.  Fetal heart tones to be obtained.      REASSESSMENT     ED Course as of 11/11/24 1735   Mon Nov 11, 2024 1734 Labs are grossly reassuring.  Fetal heart tones 155.  Remains well-appearing on reevaluation.  Patient discharged in stable condition [MM]      ED Course User Index  [MM] Star Eduardo MD         CONSULTS:  None    PROCEDURES:     Procedures          (Please note that portions of this note were completed with a voice recognition program.  Efforts were made to edit the dictations but occasionally words are mis-transcribed.)    Star Eduardo MD (electronically signed)  Emergency Attending Physician              Star Eduardo MD  11/11/24 1735

## 2024-11-11 NOTE — PROGRESS NOTES
1532 This RN called ED to notify that patient would be coming to ED after being cleared by Dr. Leyva. Pt does not have any pregnancy related complaints, only neurological symptoms. This RN spoke to Mary LEO in ED that patient would be wheeled to ED with L&D nurse.

## 2024-11-11 NOTE — TELEPHONE ENCOUNTER
13:45 pm 2024 Called Patient verified name and . The patient had sent a message yesterday 11.10.2024 stating that she water ran out her mouth when she tried to swallow anything. I was notified today of the message I called patient immediately and asked if she was still having the issue. She said yes she reports having the issue for (5) days. She described continued water flowing out of mouth while brushing her teeth, uneven smile as well as her left eye drooping. She did not complain of pain and she was appropriate while on the call. Pt is 40w1d pregnant at this time.I instructed the patient to go to the emergency room to be evaluated. I verified that patient had a ride and encouraged calling an ambulance if not.  The patient agreed to go the ED now.

## 2024-11-14 ENCOUNTER — HOSPITAL ENCOUNTER (INPATIENT)
Facility: HOSPITAL | Age: 24
LOS: 2 days | Discharge: HOME | DRG: 560 | End: 2024-11-16
Attending: FAMILY MEDICINE | Admitting: FAMILY MEDICINE
Payer: MEDICAID

## 2024-11-14 ENCOUNTER — ANESTHESIA EVENT (OUTPATIENT)
Facility: HOSPITAL | Age: 24
DRG: 560 | End: 2024-11-14
Payer: MEDICAID

## 2024-11-14 ENCOUNTER — ROUTINE PRENATAL (OUTPATIENT)
Age: 24
End: 2024-11-14
Payer: MEDICAID

## 2024-11-14 ENCOUNTER — ANESTHESIA (OUTPATIENT)
Facility: HOSPITAL | Age: 24
DRG: 560 | End: 2024-11-14
Payer: MEDICAID

## 2024-11-14 VITALS
BODY MASS INDEX: 30.43 KG/M2 | TEMPERATURE: 98 F | DIASTOLIC BLOOD PRESSURE: 60 MMHG | HEIGHT: 63 IN | HEART RATE: 105 BPM | OXYGEN SATURATION: 98 % | SYSTOLIC BLOOD PRESSURE: 103 MMHG | RESPIRATION RATE: 18 BRPM

## 2024-11-14 DIAGNOSIS — O48.0 POST-TERM PREGNANCY, 40-42 WEEKS OF GESTATION: ICD-10-CM

## 2024-11-14 DIAGNOSIS — O09.90 SUPERVISION OF HIGH RISK PREGNANCY, ANTEPARTUM: Primary | ICD-10-CM

## 2024-11-14 PROBLEM — Z3A.40 40 WEEKS GESTATION OF PREGNANCY: Status: ACTIVE | Noted: 2024-11-14

## 2024-11-14 LAB
ABO + RH BLD: NORMAL
BASOPHILS # BLD: 0 K/UL (ref 0–0.1)
BASOPHILS NFR BLD: 0 % (ref 0–1)
BLOOD GROUP ANTIBODIES SERPL: NORMAL
DIFFERENTIAL METHOD BLD: ABNORMAL
EOSINOPHIL # BLD: 0.1 K/UL (ref 0–0.4)
EOSINOPHIL NFR BLD: 1 % (ref 0–7)
ERYTHROCYTE [DISTWIDTH] IN BLOOD BY AUTOMATED COUNT: 13.6 % (ref 11.5–14.5)
HCT VFR BLD AUTO: 33 % (ref 35–47)
HGB BLD-MCNC: 11.3 G/DL (ref 11.5–16)
IMM GRANULOCYTES # BLD AUTO: 0.1 K/UL (ref 0–0.04)
IMM GRANULOCYTES NFR BLD AUTO: 1 % (ref 0–0.5)
LYMPHOCYTES # BLD: 2.3 K/UL (ref 0.8–3.5)
LYMPHOCYTES NFR BLD: 25 % (ref 12–49)
MCH RBC QN AUTO: 30.1 PG (ref 26–34)
MCHC RBC AUTO-ENTMCNC: 34.2 G/DL (ref 30–36.5)
MCV RBC AUTO: 87.8 FL (ref 80–99)
MONOCYTES # BLD: 0.6 K/UL (ref 0–1)
MONOCYTES NFR BLD: 7 % (ref 5–13)
NEUTS SEG # BLD: 5.9 K/UL (ref 1.8–8)
NEUTS SEG NFR BLD: 66 % (ref 32–75)
NRBC # BLD: 0 K/UL (ref 0–0.01)
NRBC BLD-RTO: 0 PER 100 WBC
PLATELET # BLD AUTO: 295 K/UL (ref 150–400)
PMV BLD AUTO: 11 FL (ref 8.9–12.9)
RBC # BLD AUTO: 3.76 M/UL (ref 3.8–5.2)
SPECIMEN EXP DATE BLD: NORMAL
WBC # BLD AUTO: 8.9 K/UL (ref 3.6–11)

## 2024-11-14 PROCEDURE — 86850 RBC ANTIBODY SCREEN: CPT

## 2024-11-14 PROCEDURE — 3700000025 EPIDURAL BLOCK: Performed by: ANESTHESIOLOGY

## 2024-11-14 PROCEDURE — 85025 COMPLETE CBC W/AUTO DIFF WBC: CPT

## 2024-11-14 PROCEDURE — 36415 COLL VENOUS BLD VENIPUNCTURE: CPT

## 2024-11-14 PROCEDURE — 2500000003 HC RX 250 WO HCPCS: Performed by: NURSE ANESTHETIST, CERTIFIED REGISTERED

## 2024-11-14 PROCEDURE — 0502F SUBSEQUENT PRENATAL CARE: CPT | Performed by: FAMILY MEDICINE

## 2024-11-14 PROCEDURE — 2580000003 HC RX 258

## 2024-11-14 PROCEDURE — 59025 FETAL NON-STRESS TEST: CPT | Performed by: FAMILY MEDICINE

## 2024-11-14 PROCEDURE — 6360000002 HC RX W HCPCS: Performed by: NURSE ANESTHETIST, CERTIFIED REGISTERED

## 2024-11-14 PROCEDURE — 00HU33Z INSERTION OF INFUSION DEVICE INTO SPINAL CANAL, PERCUTANEOUS APPROACH: ICD-10-PCS | Performed by: NURSE ANESTHETIST, CERTIFIED REGISTERED

## 2024-11-14 PROCEDURE — 51702 INSERT TEMP BLADDER CATH: CPT

## 2024-11-14 PROCEDURE — 6360000002 HC RX W HCPCS

## 2024-11-14 PROCEDURE — 86780 TREPONEMA PALLIDUM: CPT

## 2024-11-14 PROCEDURE — 7210000100 HC LABOR FEE PER 1 HR: Performed by: ADVANCED PRACTICE MIDWIFE

## 2024-11-14 PROCEDURE — 86901 BLOOD TYPING SEROLOGIC RH(D): CPT

## 2024-11-14 PROCEDURE — 86900 BLOOD TYPING SEROLOGIC ABO: CPT

## 2024-11-14 PROCEDURE — 1100000000 HC RM PRIVATE

## 2024-11-14 RX ORDER — DOCUSATE SODIUM 100 MG/1
100 CAPSULE, LIQUID FILLED ORAL 2 TIMES DAILY
Status: DISCONTINUED | OUTPATIENT
Start: 2024-11-14 | End: 2024-11-14

## 2024-11-14 RX ORDER — ONDANSETRON 4 MG/1
4 TABLET, ORALLY DISINTEGRATING ORAL EVERY 6 HOURS PRN
Status: DISCONTINUED | OUTPATIENT
Start: 2024-11-14 | End: 2024-11-15 | Stop reason: ALTCHOICE

## 2024-11-14 RX ORDER — CARBOPROST TROMETHAMINE 250 UG/ML
250 INJECTION, SOLUTION INTRAMUSCULAR PRN
Status: DISCONTINUED | OUTPATIENT
Start: 2024-11-14 | End: 2024-11-15 | Stop reason: ALTCHOICE

## 2024-11-14 RX ORDER — DIPHENHYDRAMINE HCL 25 MG
25 CAPSULE ORAL ONCE
Status: DISCONTINUED | OUTPATIENT
Start: 2024-11-14 | End: 2024-11-15 | Stop reason: ALTCHOICE

## 2024-11-14 RX ORDER — DOCUSATE SODIUM 100 MG/1
100 CAPSULE, LIQUID FILLED ORAL 2 TIMES DAILY PRN
Status: DISCONTINUED | OUTPATIENT
Start: 2024-11-14 | End: 2024-11-15 | Stop reason: ALTCHOICE

## 2024-11-14 RX ORDER — SODIUM CHLORIDE 0.9 % (FLUSH) 0.9 %
5-40 SYRINGE (ML) INJECTION EVERY 12 HOURS SCHEDULED
Status: DISCONTINUED | OUTPATIENT
Start: 2024-11-14 | End: 2024-11-15 | Stop reason: ALTCHOICE

## 2024-11-14 RX ORDER — TERBUTALINE SULFATE 1 MG/ML
0.25 INJECTION, SOLUTION SUBCUTANEOUS
Status: DISCONTINUED | OUTPATIENT
Start: 2024-11-14 | End: 2024-11-15 | Stop reason: ALTCHOICE

## 2024-11-14 RX ORDER — SODIUM CHLORIDE, SODIUM LACTATE, POTASSIUM CHLORIDE, AND CALCIUM CHLORIDE .6; .31; .03; .02 G/100ML; G/100ML; G/100ML; G/100ML
500 INJECTION, SOLUTION INTRAVENOUS PRN
Status: DISCONTINUED | OUTPATIENT
Start: 2024-11-14 | End: 2024-11-15 | Stop reason: ALTCHOICE

## 2024-11-14 RX ORDER — MISOPROSTOL 200 UG/1
400 TABLET ORAL PRN
Status: DISCONTINUED | OUTPATIENT
Start: 2024-11-14 | End: 2024-11-16 | Stop reason: HOSPADM

## 2024-11-14 RX ORDER — ONDANSETRON 2 MG/ML
4 INJECTION INTRAMUSCULAR; INTRAVENOUS EVERY 6 HOURS PRN
Status: DISCONTINUED | OUTPATIENT
Start: 2024-11-14 | End: 2024-11-15 | Stop reason: ALTCHOICE

## 2024-11-14 RX ORDER — SODIUM CHLORIDE 0.9 % (FLUSH) 0.9 %
5-40 SYRINGE (ML) INJECTION PRN
Status: DISCONTINUED | OUTPATIENT
Start: 2024-11-14 | End: 2024-11-15 | Stop reason: ALTCHOICE

## 2024-11-14 RX ORDER — METHYLERGONOVINE MALEATE 0.2 MG/ML
200 INJECTION INTRAVENOUS PRN
Status: DISCONTINUED | OUTPATIENT
Start: 2024-11-14 | End: 2024-11-16 | Stop reason: HOSPADM

## 2024-11-14 RX ORDER — EPHEDRINE SULFATE/0.9% NACL/PF 25 MG/5 ML
10 SYRINGE (ML) INTRAVENOUS AS NEEDED
Status: DISCONTINUED | OUTPATIENT
Start: 2024-11-14 | End: 2024-11-15 | Stop reason: ALTCHOICE

## 2024-11-14 RX ORDER — FENTANYL CITRATE 50 UG/ML
INJECTION, SOLUTION INTRAMUSCULAR; INTRAVENOUS
Status: DISCONTINUED | OUTPATIENT
Start: 2024-11-14 | End: 2024-11-15 | Stop reason: SDUPTHER

## 2024-11-14 RX ORDER — LIDOCAINE HYDROCHLORIDE AND EPINEPHRINE BITARTRATE 20; .01 MG/ML; MG/ML
INJECTION, SOLUTION SUBCUTANEOUS
Status: DISCONTINUED | OUTPATIENT
Start: 2024-11-14 | End: 2024-11-15 | Stop reason: SDUPTHER

## 2024-11-14 RX ORDER — BUPIVACAINE HYDROCHLORIDE 2.5 MG/ML
INJECTION, SOLUTION EPIDURAL; INFILTRATION; INTRACAUDAL
Status: DISCONTINUED | OUTPATIENT
Start: 2024-11-14 | End: 2024-11-15 | Stop reason: SDUPTHER

## 2024-11-14 RX ORDER — FENTANYL/BUPIVACAINE/NS/PF 2-1250MCG
10 PLASTIC BAG, INJECTION (ML) INJECTION CONTINUOUS
Status: DISCONTINUED | OUTPATIENT
Start: 2024-11-14 | End: 2024-11-15 | Stop reason: ALTCHOICE

## 2024-11-14 RX ORDER — NALOXONE HYDROCHLORIDE 0.4 MG/ML
INJECTION, SOLUTION INTRAMUSCULAR; INTRAVENOUS; SUBCUTANEOUS PRN
Status: DISCONTINUED | OUTPATIENT
Start: 2024-11-14 | End: 2024-11-15 | Stop reason: ALTCHOICE

## 2024-11-14 RX ORDER — SODIUM CHLORIDE, SODIUM LACTATE, POTASSIUM CHLORIDE, CALCIUM CHLORIDE 600; 310; 30; 20 MG/100ML; MG/100ML; MG/100ML; MG/100ML
INJECTION, SOLUTION INTRAVENOUS CONTINUOUS
Status: DISCONTINUED | OUTPATIENT
Start: 2024-11-14 | End: 2024-11-15 | Stop reason: ALTCHOICE

## 2024-11-14 RX ORDER — SODIUM CHLORIDE 9 MG/ML
25 INJECTION, SOLUTION INTRAVENOUS PRN
Status: DISCONTINUED | OUTPATIENT
Start: 2024-11-14 | End: 2024-11-15 | Stop reason: ALTCHOICE

## 2024-11-14 RX ORDER — TRANEXAMIC ACID 10 MG/ML
1000 INJECTION, SOLUTION INTRAVENOUS
Status: COMPLETED | OUTPATIENT
Start: 2024-11-14 | End: 2024-11-15

## 2024-11-14 RX ADMIN — SODIUM CHLORIDE, POTASSIUM CHLORIDE, SODIUM LACTATE AND CALCIUM CHLORIDE: 600; 310; 30; 20 INJECTION, SOLUTION INTRAVENOUS at 22:55

## 2024-11-14 RX ADMIN — SODIUM CHLORIDE, POTASSIUM CHLORIDE, SODIUM LACTATE AND CALCIUM CHLORIDE: 600; 310; 30; 20 INJECTION, SOLUTION INTRAVENOUS at 15:50

## 2024-11-14 RX ADMIN — BUPIVACAINE HYDROCHLORIDE 5 ML: 2.5 INJECTION, SOLUTION EPIDURAL; INFILTRATION; INTRACAUDAL; PERINEURAL at 16:46

## 2024-11-14 RX ADMIN — EPHEDRINE SULFATE 10 MG: 5 INJECTION INTRAVENOUS at 23:11

## 2024-11-14 RX ADMIN — Medication 10 ML/HR: at 17:03

## 2024-11-14 RX ADMIN — Medication 2 MILLI-UNITS/MIN: at 22:23

## 2024-11-14 RX ADMIN — ONDANSETRON 4 MG: 2 INJECTION INTRAMUSCULAR; INTRAVENOUS at 22:23

## 2024-11-14 RX ADMIN — SODIUM CHLORIDE, POTASSIUM CHLORIDE, SODIUM LACTATE AND CALCIUM CHLORIDE: 600; 310; 30; 20 INJECTION, SOLUTION INTRAVENOUS at 16:55

## 2024-11-14 RX ADMIN — BUPIVACAINE HYDROCHLORIDE 10 ML: 2.5 INJECTION, SOLUTION EPIDURAL; INFILTRATION; INTRACAUDAL; PERINEURAL at 22:59

## 2024-11-14 RX ADMIN — Medication 10 ML/HR: at 23:25

## 2024-11-14 RX ADMIN — BUPIVACAINE HYDROCHLORIDE 8 ML: 2.5 INJECTION, SOLUTION EPIDURAL; INFILTRATION; INTRACAUDAL; PERINEURAL at 21:39

## 2024-11-14 RX ADMIN — FENTANYL CITRATE 100 MCG: 50 INJECTION, SOLUTION INTRAMUSCULAR; INTRAVENOUS at 21:39

## 2024-11-14 RX ADMIN — EPHEDRINE SULFATE 10 MG: 5 INJECTION INTRAVENOUS at 23:38

## 2024-11-14 RX ADMIN — BUPIVACAINE HYDROCHLORIDE 5 ML: 2.5 INJECTION, SOLUTION EPIDURAL; INFILTRATION; INTRACAUDAL; PERINEURAL at 16:44

## 2024-11-14 RX ADMIN — LIDOCAINE HYDROCHLORIDE,EPINEPHRINE BITARTRATE 3 ML: 20; .01 INJECTION, SOLUTION INFILTRATION; PERINEURAL at 22:52

## 2024-11-14 ASSESSMENT — ENCOUNTER SYMPTOMS
SHORTNESS OF BREATH: 0
ABDOMINAL PAIN: 0

## 2024-11-14 NOTE — PROGRESS NOTES
Susana Navarro is a 24 y.o. female      Chief Complaint   Patient presents with    Routine Prenatal Visit     Patient is 40 weeks and 4 days. She is not having any vaginal bleeding. Patient has been brown discharge since today. She is taking her prenatal vitamins. She is having fetal movement. She has been having contractions every 5 minutes since 12. No other concerns.        \"Have you been to the ER, urgent care clinic since your last visit?  Hospitalized since your last visit?\"    NO    “Have you seen or consulted any other health care providers outside of [facility] since your last visit?”    NO              Vitals:    11/14/24 0808   BP: 103/60   Site: Right Upper Arm   Position: Sitting   Pulse: (!) 105   Resp: 18   Temp: 98 °F (36.7 °C)   TempSrc: Oral   SpO2: 98%   Height: 1.6 m (5' 3\")            Health Maintenance Due   Topic Date Due    HPV vaccine (1 - 3-dose series) Never done    COVID-19 Vaccine (1 - 2023-24 season) Never done         Medication Reconciliation completed, changes noted.  Please  Update medication list.

## 2024-11-14 NOTE — ANESTHESIA PROCEDURE NOTES
Epidural Block    Patient location during procedure: OB  Start time: 11/14/2024 4:38 PM  End time: 11/14/2024 4:45 PM  Reason for block: labor epidural  Staffing  Performed: resident/CRNA   Anesthesiologist: Zayra Roe MD  Resident/CRNA: Jovan Maciel APRN - CRNA  Performed by: Jovan Maciel APRN - CRNA  Authorized by: Zayra Roe MD    Epidural  Patient position: sitting  Prep: ChloraPrep  Patient monitoring: cardiac monitor, continuous pulse ox and frequent blood pressure checks  Location: L3-4  Injection technique: DANYELLE air  Provider prep: sterile gloves and mask  Needle  Needle type: Tuohy   Needle gauge: 17 G  Needle length: 3.5 in  Catheter type: multi-orifice  Catheter size: 20 G  Catheter at skin depth: 8 cm  Test dose: negative (3 ml 1.5% Lido with epi 1:200,000)Catheter Secured: tegaderm and tape  Assessment  Hemodynamics: stable  Attempts: 1  Outcomes: uncomplicated and patient tolerated procedure well  Preanesthetic Checklist  Completed: patient identified, IV checked, site marked, risks and benefits discussed, surgical/procedural consents, equipment checked, pre-op evaluation, timeout performed, anesthesia consent given, oxygen available, monitors applied/VS acknowledged, fire risk safety assessment completed and verbalized and blood product R/B/A discussed and consented

## 2024-11-14 NOTE — PROGRESS NOTES
Labor Progress Note  Patient seen, fetal heart rate and contraction pattern evaluated, patient examined.  Patient reports she was able to ambulate around the unit. No LOF or vaginal bleeding.  Vitals:    24 1007   BP:    Pulse:    Resp:    Temp:    SpO2: 98%       Physical Exam:  Cervical Exam:   3/50/-2 @ 1300  Membranes:  Intact  Uterine Activity: irritable  Fetal Heart Rate: Reactive  Baseline: 145 per minute  Variability: moderate  Accelerations: yes  Decelerations: none      Assessment/Plan     Susana Navarro is a 24 y.o. female  at 40w4d admitted for latent labor/IOL.    Latent Labor/IOL: Last SVE 3/50/-2 @ 1300. Membranes intact. Cat 1 tracing on monitor. Uterine irritability/occasional irregular contractions.   - continuous fetal monitoring/tocometry: ok to pause when ambulating  - Can consider starting pit pending contraction pattern  - pain control: desires epidural, wants to wait for now  - Next check: in 4 hours or sooner as needed      Pt discussed with Dr. Chan (attending physician)   Xin Rose MD  Family Practice Resident

## 2024-11-14 NOTE — H&P
History & Physical    Name: Susana Navarro MRN: [id number]  SSN: [id number]    YOB: 2000  Age: 24 y.o.  Sex: female        Subjective:     Estimated Date of Delivery: 11/10/24  OB History    Para Term  AB Living   3 2 2     2   SAB IAB Ectopic Molar Multiple Live Births             2      # Outcome Date GA Lbr Josh/2nd Weight Sex Type Anes PTL Lv   3 Current            2 Term 2016    M Vag-Spont   MIRZA   1 Term     F Vag-Spont   MIRZA       Ms. Terrance Navarro is a 24 y.o.  seen with pregnancy at 40w4d by LMP, confirmed by 11w US, for latent labor. Prenatal course was complicated by maternal CHD (s/p repair, maternal and fetal echo wnl), BV (treated), asymptomatic bacteriuria (treated), depression/anxiety. She was sent from prenatal visit due to concern for labor. She reports contractions started today around midnight. Initially, they were every 5-10 minutes. Now they have slowed down, but she states at one point they were every 2 minutes apart. Patient reports good fetal movement. Patient denies ROM or vaginal bleeding. Patient denies changes in vision, headache, fever, CP, SOB, nausea/vomiting, RUQ pain, LE edema, and calf tenderness/pain. Please see prenatal records for details.    No past medical history on file.  Past Surgical History:   Procedure Laterality Date    OTHER SURGICAL HISTORY      unclear heart history, had surgery     Social History     Occupational History    Not on file   Tobacco Use    Smoking status: Never    Smokeless tobacco: Never   Vaping Use    Vaping status: Never Used   Substance and Sexual Activity    Alcohol use: Never    Drug use: Never    Sexual activity: Yes     Partners: Male     Family History   Problem Relation Age of Onset    Diabetes Maternal Grandmother     Alzheimer's Disease Maternal Grandmother     Elevated Lipids Maternal Grandmother     Hypertension Maternal Grandmother     Diabetes Maternal Grandfather     Elevated

## 2024-11-14 NOTE — PROGRESS NOTES
Chief Complaint   Patient presents with    Routine Prenatal Visit     Patient is 40 weeks and 4 days. She is not having any vaginal bleeding. Patient has been brown discharge since today. She is taking her prenatal vitamins. She is having fetal movement. She has been having contractions every 5 minutes since 12. No other concerns.      25yo  at 40w4d by L/    IUP: Rh pos  hep B immune  pap at [facility]  low risk NIPT, Horizon neg   anatomy okay  GTT okay/borderline (129) - 37w0 - EFW 71%, AC 89%, cephalic  HgB 10.9  s/p Tdap, flu  GBS neg  last SVE   plan for PDIOL - scheduled 24 at 5am - called L&D to see if able to admit today for early labor/augmentation and agreeable, patient sent to L&D, notified team   History of Maternal CHD: corrected by unknown procedure - mobile clinic came to [facility] when she was young (\"vessel going to place it shouldn't\", had surgery through groin)  saw pediatric cardiology and fetal ECHO okay  maternal ECHO - EF 51%, wnl   Depression/Anxiety, Insomnia: doing okay  met with SW  stopped Zoloft (only took once, nausea)  offered alternatives but declined at this time  trial of hydroxyzine for sleep   Nausea/Vomiting/Weight Loss: trial of B6 + phenergan, CMP/TSH ok  improving  Vaginal Discharge: consistent with BV - metronidazole  resolved, stopped meds because of nausea  f/up wet prep with yeast, treated with clotrimazole   Asx Bacteriuria  Dysuria: 5000 GNRs, not treated  repeat urine culture NG, repeat culture NG  persistent - recheck culture NG  recurrent, culture NG   Vaginal Discharge: treated with Flagyl   Maternal Tachycardia: improved with rest and fluids     NST Procedure Note  Indication: post-dates  Impression: Reactive - 150/mod/+a/-d, no regular contractions  Time Monitored: 45 minutes    Went to classes with Miley (2)  Luna - messaged to order breast pump   Met with CM/SW  Estimated Date of Delivery: 11/10/24

## 2024-11-14 NOTE — ANESTHESIA PRE PROCEDURE
Department of Anesthesiology  Preprocedure Note       Name:  Susana Navarro   Age:  24 y.o.  :  2000                                          MRN:  [id number]         Date:  2024      Surgeon: * No surgeons listed *    Procedure: * No procedures listed *    Medications prior to admission:   Prior to Admission medications    Medication Sig Start Date End Date Taking? Authorizing Provider   Prenatal Vit-Fe Fumarate-FA (PRENATAL VITAMINS) 28-0.8 MG TABS Take 1 tablet by mouth daily 24   Marybeth Dawkins DO       Current medications:    Current Facility-Administered Medications   Medication Dose Route Frequency Provider Last Rate Last Admin   • lactated ringers infusion   IntraVENous Continuous Xin Rose  mL/hr at 24 1550 New Bag at 24 1550   • lactated ringers bolus 500 mL  500 mL IntraVENous PRN Xin Rose MD        Or   • lactated ringers bolus 500 mL  500 mL IntraVENous PRN Xin Rose MD       • sodium chloride flush 0.9 % injection 5-40 mL  5-40 mL IntraVENous 2 times per day Xin Rose MD       • sodium chloride flush 0.9 % injection 5-40 mL  5-40 mL IntraVENous PRN Xin Rose MD       • 0.9 % sodium chloride infusion  25 mL IntraVENous PRN Xin Rose MD       • methylergonovine (METHERGINE) injection 200 mcg  200 mcg IntraMUSCular PRN Xin Rose MD       • carboprost (HEMABATE) injection 250 mcg  250 mcg IntraMUSCular PRN Xin Rose MD       • miSOPROStol (CYTOTEC) tablet 400 mcg  400 mcg Buccal PRN Xin Rose MD       • tranexamic acid-NaCl IVPB premix 1,000 mg  1,000 mg IntraVENous Once PRN Xin Rose MD       • oxytocin (PITOCIN) 30 units in 500 mL infusion  87.3 mirta-units/min IntraVENous Continuous PRN Xin Rose MD        And   • oxytocin (PITOCIN) 10 unit bolus from the bag  10 Units IntraVENous PRN Xin Rose MD       •

## 2024-11-14 NOTE — CARE COORDINATION
11/14/2024  3:51 PM       11/14/24 4140   Service Assessment   Patient Orientation Alert and Oriented   Cognition Alert   History Provided By Patient   Primary Caregiver Self   Support Systems Spouse/Significant Other   PCP Verified by CM Yes   Last Visit to PCP Within last 3 months   Prior Functional Level Independent in ADLs/IADLs   Current Functional Level Independent in ADLs/IADLs   Can patient return to prior living arrangement Yes   Ability to make needs known: Good   Family able to assist with home care needs: Yes   Would you like for me to discuss the discharge plan with any other family members/significant others, and if so, who? No   Financial Resources Medicaid;[facility]     Monroe Zelaya CM

## 2024-11-14 NOTE — PROGRESS NOTES
Labor Progress Note  Patient seen, fetal heart rate and contraction pattern evaluated, patient examined.  Patient reports contractions have increased. She just had epidural placed on my assessment. She denies LOF or vaginal bleeding. Good FM.  Vitals:    24 1656   BP: (!) 102/58   Pulse: 96   Resp: 16   Temp: 98.5 °F (36.9 °C)   SpO2: 98%       Physical Exam:  Cervical Exam:   50/-2 @ 1530  Membranes:  Intact  Uterine Activity: irregular, sarah q2-5 min  Fetal Heart Rate: Reactive  Baseline: 150 per minute  Variability: moderate  Accelerations: yes  Decelerations: none      Assessment/Plan     Susana Navarro is a 24 y.o. female  at 40w4d admitted for latent labor.    Latent Labor: Last SVE 50/-2 @ 1530. Membranes intact. Cat 1 tracing on monitor. Contractions increasing, q2-5 min on monitor.  - continuous fetal monitoring/tocometry  - pain control: epidural placed around 1630  - Next check: 4 hours after last cervical check or sooner as needed      Pt discussed with Dr. Chan (attending physician)   Xin Rose MD  Family Practice Resident

## 2024-11-14 NOTE — PROGRESS NOTES
Susana Navarro is a 24 y.o. female      Chief Complaint   Patient presents with    Routine Prenatal Visit     Patient is 40 weeks and 4 days. She is not having any vaginal bleeding. Patient has been brown discharge since today. She is taking her prenatal vitamins. She is having fetal movement. She has been having contractions every 5 minutes since 12. No other concerns.        \"Have you been to the ER, urgent care clinic since your last visit?  Hospitalized since your last visit?\"    NO    “Have you seen or consulted any other health care providers outside of [facility] since your last visit?”    NO              Vitals:    11/14/24 0808   BP: 103/60   Site: Right Upper Arm   Position: Sitting   Pulse: (!) 105   Resp: 18   Temp: 98 °F (36.7 °C)   TempSrc: Oral   SpO2: 98%   Height: 1.6 m (5' 3\")            Health Maintenance Due   Topic Date Due    HPV vaccine (1 - 3-dose series) Never done    COVID-19 Vaccine (1 - 2023-24 season) Never done         Medication Reconciliation completed, changes noted.  Please  Update medication list.

## 2024-11-14 NOTE — PROGRESS NOTES
0955: Patient arrived to L&D room 209 with complaints of contractions every 5 min. Pt reports good fetal movement and denies any LOF or vaginal bleeding. Pt oriented to room and unit, plan of care discussed with pt, pt verbalized understanding.    1145: Dr. Chan at bedside. MD stated pt can come off the monitor, walk and eat.    1310: Dr. Chan at bedside, SVE per MD 3/50/-2. Will admit.    1335: Patient up to walk.     1439: EFM applied.     1500: Pt up to walk.     1545: Dr. Chan at bedside, SVE per MD 4/50/-2.    1638: TIMOTHY Maciel at bedside to place epidural.    1648: Epidural procedure complete, pt repositioned back in bed.    1900: Bedside and Verbal shift change report given to Moore RN (oncoming nurse) by LaBrake RN (offgoing nurse). Report included the following information Nurse Handoff Report, Intake/Output, MAR, Recent Results, and Med Rec Status.

## 2024-11-15 LAB — T PALLIDUM AB SER QL IA: NON REACTIVE

## 2024-11-15 PROCEDURE — 3700000156 HC EPIDURAL ANESTHESIA: Performed by: NURSE ANESTHETIST, CERTIFIED REGISTERED

## 2024-11-15 PROCEDURE — 51701 INSERT BLADDER CATHETER: CPT

## 2024-11-15 PROCEDURE — 7220000101 HC DELIVERY VAGINAL/SINGLE: Performed by: ADVANCED PRACTICE MIDWIFE

## 2024-11-15 PROCEDURE — 6370000000 HC RX 637 (ALT 250 FOR IP)

## 2024-11-15 PROCEDURE — 59400 OBSTETRICAL CARE: CPT | Performed by: FAMILY MEDICINE

## 2024-11-15 PROCEDURE — 7210000100 HC LABOR FEE PER 1 HR: Performed by: ADVANCED PRACTICE MIDWIFE

## 2024-11-15 PROCEDURE — 2500000003 HC RX 250 WO HCPCS

## 2024-11-15 PROCEDURE — 1120000000 HC RM PRIVATE OB

## 2024-11-15 PROCEDURE — 10907ZC DRAINAGE OF AMNIOTIC FLUID, THERAPEUTIC FROM PRODUCTS OF CONCEPTION, VIA NATURAL OR ARTIFICIAL OPENING: ICD-10-PCS

## 2024-11-15 RX ORDER — ONDANSETRON 2 MG/ML
4 INJECTION INTRAMUSCULAR; INTRAVENOUS EVERY 6 HOURS PRN
Status: DISCONTINUED | OUTPATIENT
Start: 2024-11-15 | End: 2024-11-16 | Stop reason: HOSPADM

## 2024-11-15 RX ORDER — IBUPROFEN 800 MG/1
800 TABLET, FILM COATED ORAL EVERY 8 HOURS SCHEDULED
Status: DISCONTINUED | OUTPATIENT
Start: 2024-11-15 | End: 2024-11-16 | Stop reason: HOSPADM

## 2024-11-15 RX ORDER — DOCUSATE SODIUM 100 MG/1
100 CAPSULE, LIQUID FILLED ORAL 2 TIMES DAILY
Status: DISCONTINUED | OUTPATIENT
Start: 2024-11-15 | End: 2024-11-16 | Stop reason: HOSPADM

## 2024-11-15 RX ORDER — ONDANSETRON 4 MG/1
4 TABLET, ORALLY DISINTEGRATING ORAL EVERY 6 HOURS PRN
Status: DISCONTINUED | OUTPATIENT
Start: 2024-11-15 | End: 2024-11-16 | Stop reason: HOSPADM

## 2024-11-15 RX ORDER — ACETAMINOPHEN 500 MG
1000 TABLET ORAL EVERY 8 HOURS SCHEDULED
Status: DISCONTINUED | OUTPATIENT
Start: 2024-11-15 | End: 2024-11-16 | Stop reason: HOSPADM

## 2024-11-15 RX ORDER — SODIUM CHLORIDE 9 MG/ML
INJECTION, SOLUTION INTRAVENOUS PRN
Status: DISCONTINUED | OUTPATIENT
Start: 2024-11-15 | End: 2024-11-16 | Stop reason: HOSPADM

## 2024-11-15 RX ORDER — SODIUM CHLORIDE 0.9 % (FLUSH) 0.9 %
5-40 SYRINGE (ML) INJECTION EVERY 12 HOURS SCHEDULED
Status: DISCONTINUED | OUTPATIENT
Start: 2024-11-15 | End: 2024-11-16 | Stop reason: HOSPADM

## 2024-11-15 RX ORDER — SODIUM CHLORIDE 0.9 % (FLUSH) 0.9 %
5-40 SYRINGE (ML) INJECTION PRN
Status: DISCONTINUED | OUTPATIENT
Start: 2024-11-15 | End: 2024-11-16 | Stop reason: HOSPADM

## 2024-11-15 RX ADMIN — ACETAMINOPHEN 1000 MG: 500 TABLET ORAL at 10:19

## 2024-11-15 RX ADMIN — IBUPROFEN 800 MG: 800 TABLET, FILM COATED ORAL at 12:29

## 2024-11-15 RX ADMIN — TRANEXAMIC ACID 1000 MG: 10 INJECTION, SOLUTION INTRAVENOUS at 02:39

## 2024-11-15 RX ADMIN — MISOPROSTOL 400 MCG: 200 TABLET ORAL at 01:37

## 2024-11-15 RX ADMIN — ACETAMINOPHEN 1000 MG: 500 TABLET ORAL at 18:04

## 2024-11-15 RX ADMIN — IBUPROFEN 800 MG: 800 TABLET, FILM COATED ORAL at 21:31

## 2024-11-15 RX ADMIN — DOCUSATE SODIUM 100 MG: 100 CAPSULE, LIQUID FILLED ORAL at 21:47

## 2024-11-15 RX ADMIN — Medication 166.7 ML: at 01:16

## 2024-11-15 RX ADMIN — IBUPROFEN 800 MG: 800 TABLET, FILM COATED ORAL at 04:14

## 2024-11-15 ASSESSMENT — PAIN SCALES - GENERAL
PAINLEVEL_OUTOF10: 4
PAINLEVEL_OUTOF10: 4
PAINLEVEL_OUTOF10: 5
PAINLEVEL_OUTOF10: 4
PAINLEVEL_OUTOF10: 6

## 2024-11-15 ASSESSMENT — PAIN DESCRIPTION - DESCRIPTORS
DESCRIPTORS: CRAMPING
DESCRIPTORS: SORE
DESCRIPTORS: ACHING;CRAMPING;SORE

## 2024-11-15 ASSESSMENT — PAIN DESCRIPTION - ORIENTATION
ORIENTATION: LOWER
ORIENTATION: ANTERIOR;LOWER
ORIENTATION: LOWER

## 2024-11-15 ASSESSMENT — PAIN DESCRIPTION - LOCATION
LOCATION: ABDOMEN
LOCATION: BACK;PERINEUM

## 2024-11-15 ASSESSMENT — PAIN - FUNCTIONAL ASSESSMENT
PAIN_FUNCTIONAL_ASSESSMENT: ACTIVITIES ARE NOT PREVENTED

## 2024-11-15 NOTE — PROGRESS NOTES
Epidural Redo Placement Event Times:    CRNA IN ROOM: 2238    CATHETER REMOVED: 2240    PROCEDURE START: 2245    CATHETER PLACED: 2250    TEST DOSE: 2252    DRESSING PLACED: 2255    BOLUS DOSE: 2259    CRNA OUT OF ROOM: 2305

## 2024-11-15 NOTE — LACTATION NOTE
This note was copied from a baby's chart.  Mother reports that breast feeding is going well, she is breast feeding on demand or every 2-3 hours without difficulty. This is her third child to breast feed. Mother to call for further lactation support if needed.    Discussed with mother her plan for feeding.  Reviewed the benefits of exclusive breast milk feeding during the hospital stay.   Informed her of the risks of using formula to supplement in the first few days of life as well as the benefits of successful breast milk feeding; referred her to the Breastfeeding booklet about this information.   She acknowledges understanding of information reviewed and states that it is her plan to breast and formula feed her infant.  Will support her choice and offer additional information as needed.     Pt chooses to do both breast and bottle.  Discussed effects of early supplementation on breastfeeding success; may decrease breastmilk production and supply, increase risk for pathological engorgement, baby may develop preference for faster flow from bottles vs breast, and baby's stomach can be stretched if larger volumes of formula are given.    Pt will successfully establish breastfeeding by feeding in response to early feeding cues   or wake every 3h, will obtain deep latch, and will keep log of feedings/output.  Taught to BF at hunger cues and or q 2-3 hrs and to offer 10-20 drops of hand expressed colostrum at any non-feeds.      Left Breast: Soft  Left Nipple: Protrude with stimulation  Right Nipple: Protrude with stimulation  Right Breast: Soft               Formula Type: Similac 360 Total Care     Latch: Repeated attempts, hold nipple in mouth, stimulate to suck  Audible Swallowing: A few with stimulation  Type of Nipple: Everted (after stimulation)  Comfort (Breast/Nipple): Soft/non-tender  Hold (Positioning): Full assist, teach one side, mother does other, staff holds  LATCH Score: 7     Care Plan Initiated: Reluctant

## 2024-11-15 NOTE — PROGRESS NOTES
Labor Progress Note  Patient seen, fetal heart rate and contraction pattern evaluated, patient examined. PT having increased discomfort/pressure secondary to contractions.     Vitals:    24 2139   BP: 106/65   Pulse: (!) 105   Resp:    Temp:    SpO2:        Physical Exam:  Cervical Exam:   /-2 @ 2000  Membranes:  Intact  Uterine Activity: irregular, sarah q2-5 min  Fetal Heart Rate: Reactive  Baseline: 145 per minute  Variability: moderate  Accelerations: yes  Decelerations: none      Assessment/Plan   Susana Navarro is a 24 y.o. female  at 40w4d admitted for latent labor.     Latent Labor: Last SVE /-2 @ 1530. Membranes intact. Cat 1 tracing on monitor. Contractions increasing, q2-5 min on monitor.  - continuous fetal monitoring/tocometry  - pain control: epidural placed around 1630  - Will start Pitocin running at 2 mL/hr and increase by 2mL/hr after 30 min.   - Next check: 4-6 hours after last cervical check or sooner as needed      Pt discussed with BEULAH Sullivan DO  Family Practice Resident

## 2024-11-15 NOTE — ANESTHESIA PROCEDURE NOTES
Epidural Block    Patient location during procedure: OB  Start time: 11/14/2024 10:38 PM  End time: 11/14/2024 11:00 PM  Reason for block: labor epidural  Staffing  Resident/CRNA: Tony Dodd APRN - CRNA  Performed by: Tony Dodd APRN - CRNA  Authorized by: Hiren Mcmahan MD    Epidural  Patient position: sitting  Prep: ChloraPrep  Patient monitoring: continuous pulse ox and frequent blood pressure checks  Approach: midline  Location: L4-5  Injection technique: DANYELLE saline  Guidance: paresthesia technique  Provider prep: mask and sterile gloves  Needle  Needle type: Tuohy   Needle gauge: 17 G  Needle length: 3.5 in  Needle insertion depth: 7 cm  Catheter type: end hole  Catheter size: 20 G  Catheter at skin depth: 12 cm  Test dose: negativeCatheter Secured: tape and tegaderm  Assessment  Sensory level: T6  Hemodynamics: stable  Attempts: 1  Outcomes: uncomplicated and patient tolerated procedure well  Preanesthetic Checklist  Completed: patient identified, IV checked, site marked, risks and benefits discussed, surgical/procedural consents, equipment checked, pre-op evaluation, timeout performed, anesthesia consent given, oxygen available, monitors applied/VS acknowledged, fire risk safety assessment completed and verbalized and blood product R/B/A discussed and consented

## 2024-11-15 NOTE — L&D DELIVERY NOTE
Patient progressed well to C/C/+2 and pushed for approximately 3 min w/  of a liveborn female infant, Apgar scores were 8/9, weight 3750 gm. Head delivered slightly KAREN . Tight nuchal cord x 1, unable to be reduced until after delivery.  Anterior right shoulder delivered w/ single maternal effort w/ posterior shoulder and body following easily. Infant placed on maternal abdomen. Delayed cord clamping x 1 min. Cord clamped x 2 and cut by FOB Pitocin infusion initiated. Placenta delivered spontaneously intact w/ 3 v cord. Perineum inspected. Fundus firm at U. Mother and baby bonding in LDR. Delivery QBL 50.      Terrance Navarro, Female Susana [[id number]]      Labor Events     Labor: No   Steroids: None  Cervical Ripening Date/Time:      Antibiotics Received during Labor: No  Rupture Identifier: Sac 1  Rupture Date/Time:  11/15/24 01:02:00   Rupture Type: AROM  Fluid Color: Clear  Fluid Odor: None  Fluid Volume: Moderate  Induction: None  Augmentation: Oxytocin  Labor Complications: None       Anesthesia    Method: Epidural       Labor Event Times      Labor onset date/time:        Dilation complete date/time:  11/15/24 01:00:00     Start pushing date/time:  11/15/2024 01:09:00   Decision date/time (emergent ):            Labor Length    2nd stage: 0h 12m  3rd stage: 0h 07m       Delivery Details      Delivery Date: 11/15/24 Delivery Time: 01:12:28   Delivery Type: Vaginal, Spontaneous              Stewart Presentation    Presentation: Vertex  _: Occiput  _: Anterior       Shoulder Dystocia    Shoulder Dystocia Present?: No       Assisted Delivery Details    Forceps Attempted?: No  Vacuum Extractor Attempted?: No                           Cord    Vessels: 3 Vessels  Complications: Nuchal Tight  Delayed Cord Clamping?: Yes  Cord Clamped Date/Time: 11/15/2024 01:15:37  Cord Blood Disposition: Lab  Gases Sent?: No              Placenta    Date/Time: 11/15/2024 01:19:50  Removal:  Circumference: 34 cm     Abdominal Girth: 33 cm              Skin to Skin      Skin to Skin Initiation Date/Time: 11/15/24 01:15:00 EST     Skin to Skin With: Mother     Breastfeeding Initiated Date/Time: 11/15/2024 01:45:00

## 2024-11-15 NOTE — PLAN OF CARE
Problem: Pain  Goal: Verbalizes/displays adequate comfort level or baseline comfort level  11/14/2024 2058 by Rafiq Mauricio, RN  Outcome: Progressing  11/14/2024 1852 by Елена Hollins, RN  Outcome: Progressing     Problem: Skin/Tissue Integrity  Goal: Absence of new skin breakdown  Description: 1.  Monitor for areas of redness and/or skin breakdown  2.  Assess vascular access sites hourly  3.  Every 4-6 hours minimum:  Change oxygen saturation probe site  4.  Every 4-6 hours:  If on nasal continuous positive airway pressure, respiratory therapy assess nares and determine need for appliance change or resting period.  Outcome: Progressing     Problem: Safety - Adult  Goal: Free from fall injury  Outcome: Progressing

## 2024-11-16 VITALS
OXYGEN SATURATION: 99 % | DIASTOLIC BLOOD PRESSURE: 65 MMHG | TEMPERATURE: 97.9 F | SYSTOLIC BLOOD PRESSURE: 96 MMHG | RESPIRATION RATE: 16 BRPM | HEART RATE: 78 BPM

## 2024-11-16 PROCEDURE — 6370000000 HC RX 637 (ALT 250 FOR IP)

## 2024-11-16 RX ORDER — PSEUDOEPHEDRINE HCL 30 MG
100 TABLET ORAL DAILY
Qty: 20 CAPSULE | Refills: 1 | Status: SHIPPED | OUTPATIENT
Start: 2024-11-16

## 2024-11-16 RX ORDER — IBUPROFEN 800 MG/1
800 TABLET, FILM COATED ORAL EVERY 8 HOURS SCHEDULED
Qty: 120 TABLET | Refills: 3 | Status: SHIPPED | OUTPATIENT
Start: 2024-11-16

## 2024-11-16 RX ADMIN — IBUPROFEN 800 MG: 800 TABLET, FILM COATED ORAL at 08:07

## 2024-11-16 RX ADMIN — ACETAMINOPHEN 1000 MG: 500 TABLET ORAL at 03:18

## 2024-11-16 RX ADMIN — ACETAMINOPHEN 1000 MG: 500 TABLET ORAL at 11:15

## 2024-11-16 RX ADMIN — DOCUSATE SODIUM 100 MG: 100 CAPSULE, LIQUID FILLED ORAL at 08:07

## 2024-11-16 ASSESSMENT — PAIN SCALES - GENERAL
PAINLEVEL_OUTOF10: 4
PAINLEVEL_OUTOF10: 6
PAINLEVEL_OUTOF10: 5
PAINLEVEL_OUTOF10: 2

## 2024-11-16 ASSESSMENT — PAIN DESCRIPTION - ORIENTATION
ORIENTATION: LOWER
ORIENTATION: LOWER
ORIENTATION: ANTERIOR;LOWER

## 2024-11-16 ASSESSMENT — PAIN DESCRIPTION - DESCRIPTORS
DESCRIPTORS: CRAMPING;DISCOMFORT
DESCRIPTORS: ACHING;CRAMPING;SORE
DESCRIPTORS: SORE;CRAMPING

## 2024-11-16 ASSESSMENT — PAIN DESCRIPTION - LOCATION
LOCATION: ABDOMEN

## 2024-11-16 NOTE — DISCHARGE SUMMARY
[id number] [place] [address]   Office [phone], Fax [phone]      Postpartum Discharge Summary     Name: Susana Navarro MRN: [id number]  SSN: [id number]    YOB: 2000  Age: 24 y.o.  Sex: female      Admit Date: 2024    Discharge Date: 2024     Admitting Physician: Yaya Chan DO     Attending Physician:  Yaya Chan DO     Attending Physician at discharge: Dr. Chan    Admission Diagnoses: 40 weeks gestation of pregnancy [Z3A.40]    Discharge Diagnoses: Principal Problem:    40 weeks gestation of pregnancy  Active Problems:    Vaginal delivery  Resolved Problems:    * No resolved hospital problems. *      Immunization(s):   Immunization History   Administered Date(s) Administered    Influenza, FLUCELVAX, (age 6 mo+) IM, Trivalent PF, 0.5mL 2024    TDaP, ADACEL (age 10y-64y), BOOSTRIX (age 10y+), IM, 0.5mL 08/15/2024        Hospital Course:   Patient is a 24 y.o.  s/p  at 40 weeks 5 days.  Presented in latent labor with regular contractions. Pregnancy complicated by maternal hx of CHD (repaired, maternal/fetal echo normal), depression/anxiety, BV/asymptomatic bacteriuria (treated) . Labor was uncomplicated.  Normal hospital course following the delivery.  On day of discharge patient reported minimal lochia, well controlled pain, and no other complaints.  Discharged with pain regimen and bowel regimen.  Advised to continue prenatal vitamins.  Follow up with Dr. Zabala scheduled for 2024 at 2:20 PM - f/u EPDS.     Condition at Discharge: Stable    Vitals:  Patient Vitals for the past 8 hrs:   BP Temp Temp src Pulse Resp SpO2   24 1007 96/65 97.9 °F (36.6 °C) Oral 78 16 99 %   24 0648 98/61 98.1 °F (36.7 °C) Oral 68 18 100 %     Temp (24hrs), Av.3 °F (36.8 °C), Min:97.9 °F (36.6 °C), Max:99.5 °F (37.5 °C)      Exam:  Gen: Patient without distress.               Lung: CTAB, no w/r/r/c               CV: RRR, + S1  and S2, no m/r/g               Abd: Abdomen soft, fundus firm at level of umbilicus, non tender               Ext: Lower extremities are negative for swelling, cords or tenderness.    Patient Instructions:   Current Discharge Medication List        START taking these medications    Details   ibuprofen (ADVIL;MOTRIN) 800 MG tablet Take 1 tablet by mouth every 8 hours  Qty: 120 tablet, Refills: 3  Start date: 11/16/2024      docusate sodium (COLACE, DULCOLAX) 100 MG CAPS Take 100 mg by mouth daily  Qty: 20 capsule, Refills: 1  Start date: 11/16/2024           CONTINUE these medications which have NOT CHANGED    Details   Prenatal Vit-Fe Fumarate-FA (PRENATAL VITAMINS) 28-0.8 MG TABS Take 1 tablet by mouth daily  Qty: 90 tablet, Refills: 1    Associated Diagnoses: Supervision of high risk pregnancy, antepartum             I have discussed the diagnosis with the patient and the intended plan as seen in the above orders.  All questions were answered concerning future plans.  I have discussed medication side effects and warnings with the patient as well.     Reference my discharge instructions.    Follow-up Information       Follow up With Specialties Details Why Contact Info    Brant Zabala MD Family Medicine Follow up on 11/29/2024 Appointment scheduled for 11/29/2024 [address] [place] [id number] [phone]              Signed By:  Felton Nichols DO    Family Medicine Resident

## 2024-11-16 NOTE — PROGRESS NOTES
[address] [id number]   Office [phone], Fax [phone]    Post-Partum Day Number 2 Progress Note    Patient doing well post-partum without significant complaint.  Pain well controlled.  Lochia minimal.  Tolerating regular diet.  Ambulating.  Voiding without difficulty.     Vitals:    Vitals:    24 0648   BP: 98/61   Pulse: 68   Resp: 18   Temp: 98.1 °F (36.7 °C)   SpO2: 100%      Temp (24hrs), Av.4 °F (36.9 °C), Min:97.9 °F (36.6 °C), Max:99.5 °F (37.5 °C)      Exam:  Patient without distress.               CTAB, no w/r/r/c.               RRR, +S1 and S2, no m/r/g.    Abdomen soft, fundus firm at level of umbilicus, nontender.               Perineum with normal lochia noted.               Lower extremities:  No edema. No palpable cords or tenderness.    Lab/Data Review:  No results found for this or any previous visit (from the past 12 hour(s)).    Assessment and Plan:    Susana Navarro is a 24 y.o.  s/p  at 40w5d. Pregnancy was complicated by maternal hx CHD (repaired, maternal/fetal echo normal), depression/anxiety, BV/asymptomatic bacteriuria (treated). Patient appears to be having uncomplicated post-partum course.      Continue routine perineal care and maternal education. Encouraged continued PO hydration  Plan discharge tomorrow if no problems occur.    Patient discussed with Dr. Chan.                 Felton Nichols DO  Family Medicine Resident

## 2024-11-16 NOTE — PROGRESS NOTES
Reviewed discharge instructions with Pt. Pt off unit in stable condition by wheelchair with volunteers for discharge home per Dr. Nichols. Pt is aware to follow up on 11/29/24. Prescription sent to pharmacy. Pt denies any headache, dizziness, n/v, or pain at this time. Infant in car seat and discharged with mother.

## 2024-11-16 NOTE — DISCHARGE INSTRUCTIONS
[email or web address]    Patient Discharge Instructions    Susana Navarro / [id number] : 2000    Admitted 2024 Discharged: 2024       Por favor tenga shalini documento presente en pathak xenia de seguimiento con pathak médico primario.    Primary care provider:   [facility]    Discharging provider:  Felton Nichols DO  - Family Medicine Resident  Dr Constance Chan  - Family Medicine Attending          Diágnostico de Admisión:  40 weeks gestation of pregnancy [Z3A.40]    RECOMENDACIONES DE CUIDADO:     Future Appointments   Date Time Provider Department Center   2024  2:20 PM Brant Zabala MD [facility]      Brant Zabala MD  [address] [facility] [address] [phone]    Follow up on 2024  Appointment scheduled for 2024      Continue Tratamiento:  - Por favor continue Motrin 800 mg, 1 tableta cada 8 horas por los próximos 2 días, luego 1 tableta cada 8 horas mientras sea necesario para el dolor.  - Por favor continue Colace 100 mg para el estreñimiento, tome 1 tableta dos veces al día hasta que pueda defercar regularmente sin necesidad del medicamento.  - Por favor continue tomando maurice vitaminas prenatales.     Importante que monitoreé los siguientes síntomas: dolor de pecho, falta de aire, fiebre, escalofríos, náusea, vómito, diarrea, cambios en pathak estado mental, caídas, debilidad y sangrado.      DIETA/que comer:  Regular    ACTIVIDAD FÍSICA:   Instrucciones de Actividad Física    No levante nada que sea más pesado que pathak bebé por las próximas 6 semanas.  No insertar nada por la vaginal por 6 semanas. Luego del parto por cesárea/ vaginal debe evitar tener relaciones sexuales por 6 semanas.Tendrá pathak xenia de seguimiento posparto a las 6 semanas. Puede manejar patahk vehículo mientras no esté tomando Percocet ni ningún medicamento nárcotico (Motrin está aditi).       Cuidado de Herida:  None      Entiendo que si surge algún problema cuando llegue a mi hogar

## 2024-11-20 NOTE — ANESTHESIA POSTPROCEDURE EVALUATION
Department of Anesthesiology  Postprocedure Note    Patient: Susana Navarro  MRN: [id number]  YOB: 2000  Date of evaluation: 11/19/2024    Procedure Summary       Date: 11/14/24 Room / Location:     Anesthesia Start: 1638 Anesthesia Stop: 11/15/24 0112    Procedure: Labor Analgesia Diagnosis:     Scheduled Providers:  Responsible Provider: Hiren Mcmahan MD    Anesthesia Type: epidural ASA Status: 2            Anesthesia Type: No value filed.    Hema Phase I:      Hema Phase II:      Anesthesia Post Evaluation    No notable events documented.

## 2024-11-27 NOTE — PROGRESS NOTES
[address] [id number]   Office [phone], Fax [phone]    Subjective:     History provided by patient       Post Partum Note    24 y.o. female , presenting for postpartum visit s/p  delivery at 40w5d.  Patient doing well post-partum without significant complaint.    Prenatal problems:   Maternal labs: GBS neg, HIV neg, HepBsAg neg. Pregnancy complicated by maternal CHD (s/p repair with normal maternal and fetal ECHO), depression/anxiety, asymptomatic bacteriuria .    Lochia: normal  Pain: controlled  Baby: doing well, has seen PCP  Sexual activity: Active  Plan for contraception: No plan  Breast/bottle: both  Support from FOB/family: yes  Symptoms of depression: none    EDPS score: score 2    SocHx.   - Denies smoking, alcohol use, illicit drug use  - Sexually active:   - Occupation:     Review of Systems  Objective:     There were no vitals filed for this visit.        Exam:  Patient without distress.    Abdomen soft, fundus firm at level of umbilicus, nontender.               Lower extremities:  No edema. No palpable cords or tenderness.      Pertinent Labs/Studies:       Assessment and orders:     Assessment and Plan:    Susana Navarro is a 24 y.o.  , presenting for postpartum visit s/p  delivery at 40w5d.  Patient having uncomplicated post-partum course.      Continue routine care  Call clinic or make appointment for symptoms of sadness  Follow up for yearly well woman exam.      Pt was discussed with Dr Ramirez (attending physician).    I have reviewed patient medical and social history and medications.  I have reviewed pertinent labs results and other data. I have discussed the diagnosis with the patient and the intended plan as seen in the above orders. The patient has received an after-visit summary and questions were answered concerning future plans. I have discussed medication side effects and warnings with the patient as well.

## 2024-11-29 ENCOUNTER — OFFICE VISIT (OUTPATIENT)
Age: 24
End: 2024-11-29

## 2024-11-29 VITALS
TEMPERATURE: 98 F | BODY MASS INDEX: 27.46 KG/M2 | HEART RATE: 70 BPM | OXYGEN SATURATION: 96 % | DIASTOLIC BLOOD PRESSURE: 63 MMHG | WEIGHT: 155 LBS | SYSTOLIC BLOOD PRESSURE: 93 MMHG

## 2024-11-29 DIAGNOSIS — N94.89 MENSTRUAL SUPPRESSION: Primary | ICD-10-CM

## 2024-11-29 RX ORDER — MEDROXYPROGESTERONE ACETATE 150 MG/ML
150 INJECTION, SUSPENSION INTRAMUSCULAR
Qty: 1 ML | Refills: 3
Start: 2024-11-29

## 2024-11-29 NOTE — PROGRESS NOTES
Identified pt with two pt identifiers(name and ). Reviewed record in preparation for visit and have obtained necessary documentation.  Chief Complaint   Patient presents with    Postpartum Care        Vitals:    24 1044   BP: 93/63   Pulse: 70   Temp: 98 °F (36.7 °C)   TempSrc: Oral   SpO2: 96%   Weight: 70.3 kg (155 lb)         Coordination of Care Questionnaire:  :     \"Have you been to the ER, urgent care clinic since your last visit?  Hospitalized since your last visit?\"    NO    “Have you seen or consulted any other health care providers outside of [facility] since your last visit?”    NO            Click Here for Release of Records Request